# Patient Record
Sex: MALE | URBAN - METROPOLITAN AREA
[De-identification: names, ages, dates, MRNs, and addresses within clinical notes are randomized per-mention and may not be internally consistent; named-entity substitution may affect disease eponyms.]

---

## 2018-05-11 ENCOUNTER — APPOINTMENT (RX ONLY)
Dept: URBAN - METROPOLITAN AREA OTHER 12 | Facility: OTHER | Age: 68
Setting detail: DERMATOLOGY
End: 2018-05-11

## 2018-05-11 DIAGNOSIS — L82.0 INFLAMED SEBORRHEIC KERATOSIS: ICD-10-CM

## 2018-05-11 PROCEDURE — 17110 DESTRUCTION B9 LES UP TO 14: CPT

## 2018-05-11 PROCEDURE — ? LIQUID NITROGEN

## 2018-05-11 PROCEDURE — ? COUNSELING

## 2018-05-11 ASSESSMENT — LOCATION SIMPLE DESCRIPTION DERM
LOCATION SIMPLE: SCALP
LOCATION SIMPLE: RIGHT UPPER BACK
LOCATION SIMPLE: RIGHT LOWER BACK
LOCATION SIMPLE: LEFT UPPER BACK

## 2018-05-11 ASSESSMENT — LOCATION DETAILED DESCRIPTION DERM
LOCATION DETAILED: RIGHT INFERIOR UPPER BACK
LOCATION DETAILED: LEFT SUPERIOR UPPER BACK
LOCATION DETAILED: RIGHT SUPERIOR PARIETAL SCALP
LOCATION DETAILED: RIGHT MID-UPPER BACK
LOCATION DETAILED: LEFT INFERIOR UPPER BACK
LOCATION DETAILED: RIGHT SUPERIOR MEDIAL MIDBACK
LOCATION DETAILED: LEFT MID-UPPER BACK

## 2018-05-11 ASSESSMENT — LOCATION ZONE DERM
LOCATION ZONE: TRUNK
LOCATION ZONE: SCALP

## 2018-09-19 ENCOUNTER — APPOINTMENT (RX ONLY)
Dept: URBAN - METROPOLITAN AREA OTHER 12 | Facility: OTHER | Age: 68
Setting detail: DERMATOLOGY
End: 2018-09-19

## 2018-09-19 DIAGNOSIS — L82.0 INFLAMED SEBORRHEIC KERATOSIS: ICD-10-CM

## 2018-09-19 PROCEDURE — ? LIQUID NITROGEN

## 2018-09-19 PROCEDURE — 17110 DESTRUCTION B9 LES UP TO 14: CPT

## 2018-09-19 PROCEDURE — ? COUNSELING

## 2018-09-19 ASSESSMENT — LOCATION DETAILED DESCRIPTION DERM
LOCATION DETAILED: LEFT SUPERIOR MEDIAL UPPER BACK
LOCATION DETAILED: LEFT INFERIOR UPPER BACK
LOCATION DETAILED: SUPERIOR LUMBAR SPINE
LOCATION DETAILED: LEFT SUPERIOR OCCIPITAL SCALP

## 2018-09-19 ASSESSMENT — LOCATION ZONE DERM
LOCATION ZONE: TRUNK
LOCATION ZONE: SCALP

## 2018-09-19 ASSESSMENT — LOCATION SIMPLE DESCRIPTION DERM
LOCATION SIMPLE: LEFT OCCIPITAL SCALP
LOCATION SIMPLE: LEFT UPPER BACK
LOCATION SIMPLE: LOWER BACK

## 2018-09-19 NOTE — PROCEDURE: LIQUID NITROGEN
Number Of Freeze-Thaw Cycles: 2 freeze-thaw cycles
Post-Care Instructions: I reviewed with the patient in detail post-care instructions. Patient is to wear sunprotection, and avoid picking at any of the treated lesions. Pt may apply Vaseline to crusted or scabbing areas.
Consent: The patient's consent was obtained including but not limited to risks of crusting, scabbing, blistering, scarring, darker or lighter pigmentary change, recurrence, incomplete removal and infection.
Duration Of Freeze Thaw-Cycle (Seconds): 0
Detail Level: Zone
Render Post Care In The Note?: yes
Medical Necessity Clause: This procedure was medically necessary because the lesions that were treated were:
Total Number Of Lesions Treated: 4
Medical Necessity Information: It is in your best interest to select a reason for this procedure from the list below. All of these items fulfill various CMS LCD requirements except the new and changing color options.
Add 52 Modifier (Optional): no

## 2019-12-12 NOTE — PROCEDURE: LIQUID NITROGEN
Number Of Freeze-Thaw Cycles: 1 freeze-thaw cycle
Consent: The patient's consent was obtained including but not limited to risks of crusting, scabbing, blistering, scarring, darker or lighter pigmentary change, recurrence, incomplete removal and infection.
Add 52 Modifier (Optional): no
Medical Necessity Clause: This procedure was medically necessary because the lesions that were treated were:
Post-Care Instructions: I reviewed with the patient in detail post-care instructions. Patient is to wear sunprotection, and avoid picking at any of the treated lesions. Pt may apply Vaseline to crusted or scabbing areas.
Medical Necessity Information: It is in your best interest to select a reason for this procedure from the list below. All of these items fulfill various CMS LCD requirements except the new and changing color options.
Detail Level: Zone
Mauc Instructions: By selecting yes to the question below the MAUC number will be added into the note.  This will be calculated automatically based on the diagnosis chosen, the size entered, the body zone selected (H,M,L) and the specific indications you chose. You will also have the option to override the Mohs AUC if you disagree with the automatically calculated number and this option is found in the Case Summary tab.

## 2020-03-24 ENCOUNTER — OFFICE VISIT (OUTPATIENT)
Dept: URGENT CARE | Facility: CLINIC | Age: 70
End: 2020-03-24
Payer: MEDICARE

## 2020-03-24 VITALS
SYSTOLIC BLOOD PRESSURE: 122 MMHG | HEIGHT: 68 IN | BODY MASS INDEX: 30.62 KG/M2 | TEMPERATURE: 100.7 F | HEART RATE: 92 BPM | WEIGHT: 202 LBS | DIASTOLIC BLOOD PRESSURE: 64 MMHG | OXYGEN SATURATION: 100 %

## 2020-03-24 DIAGNOSIS — R50.9 FEVER, UNSPECIFIED FEVER CAUSE: ICD-10-CM

## 2020-03-24 LAB
FLUAV+FLUBV AG SPEC QL IA: NORMAL
INT CON NEG: NORMAL
INT CON POS: NORMAL

## 2020-03-24 PROCEDURE — 99204 OFFICE O/P NEW MOD 45 MIN: CPT | Performed by: PHYSICIAN ASSISTANT

## 2020-03-24 PROCEDURE — 87804 INFLUENZA ASSAY W/OPTIC: CPT | Performed by: PHYSICIAN ASSISTANT

## 2020-03-24 ASSESSMENT — ENCOUNTER SYMPTOMS
WEAKNESS: 1
COUGH: 1
CHILLS: 1
FEVER: 1
HEADACHES: 1
MYALGIAS: 1
ARTHRALGIAS: 1
BODY ACHES: 1

## 2020-03-24 NOTE — PATIENT INSTRUCTIONS
Self-Isolating at Home  If it is determined that you do not need to be hospitalized and can be isolated at home please follow the follow the prevention steps below as based on CDC guidelines.  Stay home except to get medical care  People who are mildly ill with unconfirmed COVID-19 or have any other infectious respiratory illness are able to isolate at home during their illness. You should restrict activities outside your home, except for getting medical care. Do not go to work, school, or public areas. Avoid using public transportation, ride-sharing, or taxis.  Call ahead before visiting your doctor  If you have a medical appointment, call the healthcare provider and tell them that you have or may have unconfirmed COVID-19 or another possibly contagious respiratory illness. This will help the healthcare provider’s office take steps to keep other people from getting infected or exposed.  Separate yourself from other people and animals in your home  As much as possible, you should stay in a specific room and away from other people in your home. Also, you should use a separate bathroom, if available.  You should restrict contact with pets and other animals while you are sick, just like you would around other people. When possible, have another member of your household care for your animals while you are sick. If you must care for your pet or be around animals while you are sick, wash your hands before and after you interact with pets.  Wear a facemask  You should wear a facemask when you are around other people (e.g., sharing a room or vehicle) or pets and before you enter a healthcare provider’s office. If you are not able to wear a facemask (for example, because it causes trouble breathing), then people who live with you should not stay in the same room with you, or they should wear a facemask if they enter your room.  Cover your coughs and sneezes  Cover your mouth and nose with a tissue when you cough or sneeze.  Throw used tissues in a lined trash can. Immediately wash your hands with soap and water for at least 20 seconds or, if soap and water are not available, clean your hands with an alcohol-based hand  that contains at least 60% alcohol.  Clean your hands often  Wash your hands often with soap and water for at least 20 seconds, especially after blowing your nose, coughing, or sneezing; going to the bathroom; and before eating or preparing food. If soap and water are not readily available, use an alcohol-based hand  with at least 60% alcohol, covering all surfaces of your hands and rubbing them together until they feel dry.  Soap and water are the best option if hands are visibly dirty. Avoid touching your eyes, nose, and mouth with unwashed hands.  Avoid sharing personal household items  You should not share dishes, drinking glasses, cups, eating utensils, towels, or bedding with other people or pets in your home. After using these items, they should be washed thoroughly with soap and water.  Clean all “high-touch” surfaces everyday  High touch surfaces include counters, tabletops, doorknobs, bathroom fixtures, toilets, phones, keyboards, tablets, and bedside tables. Also, clean any surfaces that may have blood, stool, or body fluids on them. Use a household cleaning spray or wipe, according to the label instructions. Labels contain instructions for safe and effective use of the cleaning product including precautions you should take when applying the product, such as wearing gloves and making sure you have good ventilation during use of the product.  Monitor your symptoms  Seek prompt medical attention if your illness is worsening (e.g., difficulty breathing). Before seeking care, call your healthcare provider and tell them that you have, or are being evaluated for, unconfirmed COVID-19 or another infectious respiratory illness. Put on a facemask before you enter the facility. These steps will help  the healthcare provider’s office to keep other people in the office or waiting room from getting infected or exposed. Ask your healthcare provider to call the local or state health department. Persons who are placed under active monitoring or facilitated self-monitoring should follow instructions provided by their local health department or occupational health professionals, as appropriate. When working with your local health department check their available hours.  If you have a medical emergency and need to call 911, notify the dispatch personnel that you have, or are being evaluated for unconfirmed COVID-19 or another infectious respiratory illness. If possible, put on a facemask before emergency medical services arrive.  Discontinuing home isolation  Patients with unconfirmed COVID-19 or other infectious respiratory illnesses should remain under home isolation precautions until the risk of secondary transmission to others is thought to be low. In general that means 72 hours after fever resolves without the use of fever reducing medications, AND symptoms have improved AND at least 7 days since symptoms first appeared. If you have questions or concerns consult your healthcare providers or your local health department.  Per CDC guidelines, you are not required to provide a healthcare provider’s note to validate your illness or to return to work, as healthcare provider offices and medical facilities may be extremely busy and not able to provide such documentation in a timely way.

## 2020-03-24 NOTE — PROGRESS NOTES
"  Subjective:   Justin Copeland is a 69 y.o. male who presents today with   Chief Complaint   Patient presents with   • Chills     last night   • Body Aches       Generalized Body Aches   This is a new problem. The current episode started yesterday. The problem occurs constantly. The problem has been unchanged. Associated symptoms include arthralgias, chills, coughing, a fever, headaches, myalgias and weakness. Pertinent negatives include no urinary symptoms. He has tried nothing for the symptoms. The treatment provided no relief.     Patient does report recent travel history stating that he returned from [place] on March 13.  He states he has had his flu and pneumonia shot recently.  PMH:  has no past medical history on file.  MEDS: No current outpatient medications on file.  ALLERGIES: Not on File  SURGHX: No past surgical history on file.  SOCHX:  reports that he has never smoked. He has never used smokeless tobacco.  FH: Reviewed with patient, not pertinent to this visit.       Review of Systems   Constitutional: Positive for chills, fever and malaise/fatigue.   Respiratory: Positive for cough.    Musculoskeletal: Positive for arthralgias and myalgias.   Neurological: Positive for weakness and headaches.   All other systems reviewed and are negative.       Objective:   /64   Pulse 92   Temp (!) 38.2 °C (100.7 °F) (Temporal)   Ht 1.727 m (5' 8\")   Wt 91.6 kg (202 lb)   SpO2 100%   BMI 30.71 kg/m²   Physical Exam  Vitals signs and nursing note reviewed.   Constitutional:       General: He is not in acute distress.     Appearance: Normal appearance. He is well-developed and normal weight. He is ill-appearing. He is not toxic-appearing or diaphoretic.   HENT:      Head: Normocephalic and atraumatic.      Right Ear: Hearing, tympanic membrane and ear canal normal.      Left Ear: Hearing, tympanic membrane and ear canal normal.   Eyes:      Pupils: Pupils are equal, round, and reactive to light. "   Cardiovascular:      Rate and Rhythm: Normal rate and regular rhythm.      Heart sounds: Normal heart sounds.   Pulmonary:      Effort: Pulmonary effort is normal. No respiratory distress.      Breath sounds: No stridor. No wheezing, rhonchi or rales.   Musculoskeletal:      Comments: Normal movement in all 4 extremities   Skin:     General: Skin is warm and dry.   Neurological:      Mental Status: He is alert.      Coordination: Coordination normal.   Psychiatric:         Mood and Affect: Mood normal.       FLU NEG    Assessment/Plan:   Assessment    1. Fever, unspecified fever cause  - POCT Influenza A/B  Given strong history of recent travel full PPE was worn throughout visit and patient was immediately roomed.  Discussed with patient given unexplained fever and recent travel he should follow CDC guidelines and self quarantine at this time.  Patient was given self-isolation instructions today.  Patient encouraged to get plenty of rest, use OTC tylenol for pain/fever, and drink plenty of fluids.    Differential diagnosis, natural history, supportive care, and indications for immediate follow-up discussed.   Patient given instructions and understanding of medications and treatment.    If not improving in 3-5 days, F/U with PCP or return to  if symptoms worsen.    Patient agreeable to plan.    Please note that this dictation was created using voice recognition software. I have made every reasonable attempt to correct obvious errors, but I expect that there are errors of grammar and possibly content that I did not discover before finalizing the note.    Israel Bush PA-C

## 2020-03-25 ENCOUNTER — HOSPITAL ENCOUNTER (INPATIENT)
Facility: MEDICAL CENTER | Age: 70
LOS: 4 days | DRG: 871 | End: 2020-03-29
Attending: EMERGENCY MEDICINE | Admitting: HOSPITALIST
Payer: MEDICARE

## 2020-03-25 ENCOUNTER — APPOINTMENT (OUTPATIENT)
Dept: RADIOLOGY | Facility: MEDICAL CENTER | Age: 70
DRG: 871 | End: 2020-03-25
Attending: EMERGENCY MEDICINE
Payer: MEDICARE

## 2020-03-25 DIAGNOSIS — A41.50 SEPSIS DUE TO GRAM-NEGATIVE ORGANISM WITH SEPTIC SHOCK (HCC): ICD-10-CM

## 2020-03-25 DIAGNOSIS — R06.03 ACUTE RESPIRATORY DISTRESS: ICD-10-CM

## 2020-03-25 DIAGNOSIS — R65.21 SEPSIS DUE TO GRAM-NEGATIVE ORGANISM WITH SEPTIC SHOCK (HCC): ICD-10-CM

## 2020-03-25 PROBLEM — N17.9 ACUTE RENAL FAILURE (ARF) (HCC): Status: ACTIVE | Noted: 2020-03-25

## 2020-03-25 PROBLEM — I10 HYPERTENSION: Status: ACTIVE | Noted: 2020-03-25

## 2020-03-25 PROBLEM — E87.20 LACTIC ACIDOSIS: Status: ACTIVE | Noted: 2020-03-25

## 2020-03-25 PROBLEM — R65.20 SEVERE SEPSIS (HCC): Status: ACTIVE | Noted: 2020-03-25

## 2020-03-25 PROBLEM — J96.01 ACUTE RESPIRATORY FAILURE WITH HYPOXIA (HCC): Status: ACTIVE | Noted: 2020-03-25

## 2020-03-25 PROBLEM — R57.9 SHOCK (HCC): Status: ACTIVE | Noted: 2020-03-25

## 2020-03-25 PROBLEM — A41.9 SEPSIS (HCC): Status: ACTIVE | Noted: 2020-03-25

## 2020-03-25 LAB
ALBUMIN SERPL BCP-MCNC: 3.9 G/DL (ref 3.2–4.9)
ALBUMIN/GLOB SERPL: 1.3 G/DL
ALP SERPL-CCNC: 67 U/L (ref 30–99)
ALT SERPL-CCNC: 32 U/L (ref 2–50)
ANION GAP SERPL CALC-SCNC: 23 MMOL/L (ref 7–16)
APPEARANCE UR: CLEAR
AST SERPL-CCNC: 43 U/L (ref 12–45)
BACTERIA #/AREA URNS HPF: ABNORMAL /HPF
BASOPHILS # BLD AUTO: 0.4 % (ref 0–1.8)
BASOPHILS # BLD: 0.07 K/UL (ref 0–0.12)
BILIRUB SERPL-MCNC: 1.7 MG/DL (ref 0.1–1.5)
BILIRUB UR QL STRIP.AUTO: ABNORMAL
BUN SERPL-MCNC: 20 MG/DL (ref 8–22)
CALCIUM SERPL-MCNC: 8.8 MG/DL (ref 8.4–10.2)
CASTS URNS QL MICRO: ABNORMAL /LPF
CHLORIDE SERPL-SCNC: 92 MMOL/L (ref 96–112)
CO2 SERPL-SCNC: 18 MMOL/L (ref 20–33)
COLOR UR: YELLOW
CREAT SERPL-MCNC: 1.95 MG/DL (ref 0.5–1.4)
D DIMER PPP IA.FEU-MCNC: 6.37 UG/ML (FEU) (ref 0–0.5)
EKG IMPRESSION: NORMAL
EOSINOPHIL # BLD AUTO: 0.04 K/UL (ref 0–0.51)
EOSINOPHIL NFR BLD: 0.3 % (ref 0–6.9)
EPI CELLS #/AREA URNS HPF: ABNORMAL /HPF
ERYTHROCYTE [DISTWIDTH] IN BLOOD BY AUTOMATED COUNT: 45.6 FL (ref 35.9–50)
FLUAV RNA SPEC QL NAA+PROBE: NEGATIVE
FLUBV RNA SPEC QL NAA+PROBE: NEGATIVE
GLOBULIN SER CALC-MCNC: 3 G/DL (ref 1.9–3.5)
GLUCOSE SERPL-MCNC: 153 MG/DL (ref 65–99)
GLUCOSE UR STRIP.AUTO-MCNC: NEGATIVE MG/DL
HCT VFR BLD AUTO: 47.6 % (ref 42–52)
HGB BLD-MCNC: 15.9 G/DL (ref 14–18)
IMM GRANULOCYTES # BLD AUTO: 0.13 K/UL (ref 0–0.11)
IMM GRANULOCYTES NFR BLD AUTO: 0.8 % (ref 0–0.9)
INR PPP: 1.04 (ref 0.87–1.13)
KETONES UR STRIP.AUTO-MCNC: 15 MG/DL
LACTATE BLD-SCNC: 2.7 MMOL/L (ref 0.5–2)
LACTATE BLD-SCNC: 2.9 MMOL/L (ref 0.5–2)
LACTATE BLD-SCNC: 3.1 MMOL/L (ref 0.5–2)
LACTATE BLD-SCNC: 5.2 MMOL/L (ref 0.5–2)
LEUKOCYTE ESTERASE UR QL STRIP.AUTO: NEGATIVE
LYMPHOCYTES # BLD AUTO: 0.6 K/UL (ref 1–4.8)
LYMPHOCYTES NFR BLD: 3.8 % (ref 22–41)
MCH RBC QN AUTO: 30.6 PG (ref 27–33)
MCHC RBC AUTO-ENTMCNC: 33.4 G/DL (ref 33.7–35.3)
MCV RBC AUTO: 91.7 FL (ref 81.4–97.8)
MICRO URNS: ABNORMAL
MONOCYTES # BLD AUTO: 0.3 K/UL (ref 0–0.85)
MONOCYTES NFR BLD AUTO: 1.9 % (ref 0–13.4)
MUCOUS THREADS #/AREA URNS HPF: ABNORMAL /HPF
NEUTROPHILS # BLD AUTO: 14.54 K/UL (ref 1.82–7.42)
NEUTROPHILS NFR BLD: 92.8 % (ref 44–72)
NITRITE UR QL STRIP.AUTO: NEGATIVE
NRBC # BLD AUTO: 0 K/UL
NRBC BLD-RTO: 0 /100 WBC
NT-PROBNP SERPL IA-MCNC: 620 PG/ML (ref 0–125)
PH UR STRIP.AUTO: 5.5 [PH] (ref 5–8)
PLATELET # BLD AUTO: 225 K/UL (ref 164–446)
PMV BLD AUTO: 11.1 FL (ref 9–12.9)
POTASSIUM SERPL-SCNC: 4.3 MMOL/L (ref 3.6–5.5)
PROCALCITONIN SERPL-MCNC: 6.94 NG/ML
PROT SERPL-MCNC: 6.9 G/DL (ref 6–8.2)
PROT UR QL STRIP: 100 MG/DL
PROTHROMBIN TIME: 13.7 SEC (ref 12–14.6)
RBC # BLD AUTO: 5.19 M/UL (ref 4.7–6.1)
RBC # URNS HPF: ABNORMAL /HPF
RBC UR QL AUTO: NEGATIVE
SODIUM SERPL-SCNC: 133 MMOL/L (ref 135–145)
SP GR UR REFRACTOMETRY: 1.02
TROPONIN T SERPL-MCNC: 33 NG/L (ref 6–19)
UNIDENT CRYS URNS QL MICRO: ABNORMAL /HPF
WBC # BLD AUTO: 15.7 K/UL (ref 4.8–10.8)
WBC #/AREA URNS HPF: ABNORMAL /HPF

## 2020-03-25 PROCEDURE — 83880 ASSAY OF NATRIURETIC PEPTIDE: CPT

## 2020-03-25 PROCEDURE — 87086 URINE CULTURE/COLONY COUNT: CPT

## 2020-03-25 PROCEDURE — 700111 HCHG RX REV CODE 636 W/ 250 OVERRIDE (IP): Performed by: HOSPITALIST

## 2020-03-25 PROCEDURE — 770022 HCHG ROOM/CARE - ICU (200)

## 2020-03-25 PROCEDURE — 85379 FIBRIN DEGRADATION QUANT: CPT

## 2020-03-25 PROCEDURE — 83605 ASSAY OF LACTIC ACID: CPT | Mod: 91

## 2020-03-25 PROCEDURE — A9270 NON-COVERED ITEM OR SERVICE: HCPCS | Performed by: HOSPITALIST

## 2020-03-25 PROCEDURE — 87040 BLOOD CULTURE FOR BACTERIA: CPT | Mod: 91

## 2020-03-25 PROCEDURE — 84484 ASSAY OF TROPONIN QUANT: CPT

## 2020-03-25 PROCEDURE — 700117 HCHG RX CONTRAST REV CODE 255: Performed by: EMERGENCY MEDICINE

## 2020-03-25 PROCEDURE — 85610 PROTHROMBIN TIME: CPT

## 2020-03-25 PROCEDURE — 99291 CRITICAL CARE FIRST HOUR: CPT | Performed by: INTERNAL MEDICINE

## 2020-03-25 PROCEDURE — 80053 COMPREHEN METABOLIC PANEL: CPT

## 2020-03-25 PROCEDURE — 700111 HCHG RX REV CODE 636 W/ 250 OVERRIDE (IP): Performed by: EMERGENCY MEDICINE

## 2020-03-25 PROCEDURE — 700105 HCHG RX REV CODE 258: Performed by: EMERGENCY MEDICINE

## 2020-03-25 PROCEDURE — 700105 HCHG RX REV CODE 258: Performed by: HOSPITALIST

## 2020-03-25 PROCEDURE — 700102 HCHG RX REV CODE 250 W/ 637 OVERRIDE(OP): Performed by: HOSPITALIST

## 2020-03-25 PROCEDURE — 87502 INFLUENZA DNA AMP PROBE: CPT

## 2020-03-25 PROCEDURE — 96367 TX/PROPH/DG ADDL SEQ IV INF: CPT

## 2020-03-25 PROCEDURE — 93005 ELECTROCARDIOGRAM TRACING: CPT

## 2020-03-25 PROCEDURE — 81001 URINALYSIS AUTO W/SCOPE: CPT

## 2020-03-25 PROCEDURE — 84145 PROCALCITONIN (PCT): CPT

## 2020-03-25 PROCEDURE — 87077 CULTURE AEROBIC IDENTIFY: CPT

## 2020-03-25 PROCEDURE — 99291 CRITICAL CARE FIRST HOUR: CPT | Performed by: HOSPITALIST

## 2020-03-25 PROCEDURE — 87186 SC STD MICRODIL/AGAR DIL: CPT

## 2020-03-25 PROCEDURE — 99291 CRITICAL CARE FIRST HOUR: CPT

## 2020-03-25 PROCEDURE — 85025 COMPLETE CBC W/AUTO DIFF WBC: CPT

## 2020-03-25 PROCEDURE — 96365 THER/PROPH/DIAG IV INF INIT: CPT

## 2020-03-25 PROCEDURE — 71045 X-RAY EXAM CHEST 1 VIEW: CPT

## 2020-03-25 PROCEDURE — 71275 CT ANGIOGRAPHY CHEST: CPT

## 2020-03-25 PROCEDURE — 700101 HCHG RX REV CODE 250: Performed by: HOSPITALIST

## 2020-03-25 RX ORDER — LABETALOL HYDROCHLORIDE 5 MG/ML
10 INJECTION, SOLUTION INTRAVENOUS EVERY 4 HOURS PRN
Status: DISCONTINUED | OUTPATIENT
Start: 2020-03-25 | End: 2020-03-26

## 2020-03-25 RX ORDER — BISACODYL 10 MG
10 SUPPOSITORY, RECTAL RECTAL
Status: DISCONTINUED | OUTPATIENT
Start: 2020-03-25 | End: 2020-03-29 | Stop reason: HOSPADM

## 2020-03-25 RX ORDER — AMPICILLIN AND SULBACTAM 2; 1 G/1; G/1
INJECTION, POWDER, FOR SOLUTION INTRAMUSCULAR; INTRAVENOUS
Status: COMPLETED
Start: 2020-03-25 | End: 2020-03-25

## 2020-03-25 RX ORDER — ONDANSETRON 4 MG/1
4 TABLET, ORALLY DISINTEGRATING ORAL EVERY 4 HOURS PRN
Status: DISCONTINUED | OUTPATIENT
Start: 2020-03-25 | End: 2020-03-29 | Stop reason: HOSPADM

## 2020-03-25 RX ORDER — ALBUTEROL SULFATE 90 UG/1
2 AEROSOL, METERED RESPIRATORY (INHALATION) EVERY 4 HOURS PRN
Status: DISCONTINUED | OUTPATIENT
Start: 2020-03-25 | End: 2020-03-25

## 2020-03-25 RX ORDER — LISINOPRIL 10 MG/1
10 TABLET ORAL DAILY
COMMUNITY
Start: 2020-03-04

## 2020-03-25 RX ORDER — AZITHROMYCIN 250 MG/1
500 TABLET, FILM COATED ORAL DAILY
Status: DISCONTINUED | OUTPATIENT
Start: 2020-03-25 | End: 2020-03-26

## 2020-03-25 RX ORDER — ACETAMINOPHEN 325 MG/1
650 TABLET ORAL EVERY 6 HOURS PRN
Status: DISCONTINUED | OUTPATIENT
Start: 2020-03-25 | End: 2020-03-29 | Stop reason: HOSPADM

## 2020-03-25 RX ORDER — SODIUM CHLORIDE, SODIUM LACTATE, POTASSIUM CHLORIDE, AND CALCIUM CHLORIDE .6; .31; .03; .02 G/100ML; G/100ML; G/100ML; G/100ML
30 INJECTION, SOLUTION INTRAVENOUS
Status: COMPLETED | OUTPATIENT
Start: 2020-03-25 | End: 2020-03-25

## 2020-03-25 RX ORDER — SODIUM CHLORIDE, SODIUM LACTATE, POTASSIUM CHLORIDE, CALCIUM CHLORIDE 600; 310; 30; 20 MG/100ML; MG/100ML; MG/100ML; MG/100ML
INJECTION, SOLUTION INTRAVENOUS CONTINUOUS
Status: DISCONTINUED | OUTPATIENT
Start: 2020-03-25 | End: 2020-03-27

## 2020-03-25 RX ORDER — ONDANSETRON 2 MG/ML
4 INJECTION INTRAMUSCULAR; INTRAVENOUS EVERY 4 HOURS PRN
Status: DISCONTINUED | OUTPATIENT
Start: 2020-03-25 | End: 2020-03-29 | Stop reason: HOSPADM

## 2020-03-25 RX ORDER — FUROSEMIDE 20 MG/1
20 TABLET ORAL DAILY
COMMUNITY
Start: 2020-03-04

## 2020-03-25 RX ORDER — SODIUM CHLORIDE, SODIUM LACTATE, POTASSIUM CHLORIDE, AND CALCIUM CHLORIDE .6; .31; .03; .02 G/100ML; G/100ML; G/100ML; G/100ML
1000 INJECTION, SOLUTION INTRAVENOUS
Status: COMPLETED | OUTPATIENT
Start: 2020-03-25 | End: 2020-03-26

## 2020-03-25 RX ORDER — SODIUM CHLORIDE, SODIUM LACTATE, POTASSIUM CHLORIDE, AND CALCIUM CHLORIDE .6; .31; .03; .02 G/100ML; G/100ML; G/100ML; G/100ML
30 INJECTION, SOLUTION INTRAVENOUS
Status: DISCONTINUED | OUTPATIENT
Start: 2020-03-25 | End: 2020-03-29 | Stop reason: HOSPADM

## 2020-03-25 RX ORDER — AMOXICILLIN 250 MG
2 CAPSULE ORAL 2 TIMES DAILY
Status: DISCONTINUED | OUTPATIENT
Start: 2020-03-25 | End: 2020-03-29 | Stop reason: HOSPADM

## 2020-03-25 RX ORDER — POLYETHYLENE GLYCOL 3350 17 G/17G
1 POWDER, FOR SOLUTION ORAL
Status: DISCONTINUED | OUTPATIENT
Start: 2020-03-25 | End: 2020-03-29 | Stop reason: HOSPADM

## 2020-03-25 RX ADMIN — SODIUM CHLORIDE, POTASSIUM CHLORIDE, SODIUM LACTATE AND CALCIUM CHLORIDE 2733 ML: 600; 310; 30; 20 INJECTION, SOLUTION INTRAVENOUS at 14:05

## 2020-03-25 RX ADMIN — NOREPINEPHRINE BITARTRATE 2 MCG/MIN: 1 INJECTION, SOLUTION, CONCENTRATE INTRAVENOUS at 22:47

## 2020-03-25 RX ADMIN — AMPICILLIN SODIUM AND SULBACTAM SODIUM 3 G: 2; 1 INJECTION, POWDER, FOR SOLUTION INTRAMUSCULAR; INTRAVENOUS at 23:52

## 2020-03-25 RX ADMIN — CEFTRIAXONE SODIUM 1 G: 1 INJECTION, POWDER, FOR SOLUTION INTRAMUSCULAR; INTRAVENOUS at 14:05

## 2020-03-25 RX ADMIN — IOHEXOL 75 ML: 350 INJECTION, SOLUTION INTRAVENOUS at 14:48

## 2020-03-25 RX ADMIN — AMPICILLIN SODIUM AND SULBACTAM SODIUM 3 G: 2; 1 INJECTION, POWDER, FOR SOLUTION INTRAMUSCULAR; INTRAVENOUS at 16:15

## 2020-03-25 RX ADMIN — AZITHROMYCIN MONOHYDRATE 500 MG: 250 TABLET ORAL at 18:46

## 2020-03-25 RX ADMIN — SODIUM CHLORIDE, POTASSIUM CHLORIDE, SODIUM LACTATE AND CALCIUM CHLORIDE: 600; 310; 30; 20 INJECTION, SOLUTION INTRAVENOUS at 18:40

## 2020-03-25 ASSESSMENT — COGNITIVE AND FUNCTIONAL STATUS - GENERAL
TOILETING: A LITTLE
WALKING IN HOSPITAL ROOM: A LITTLE
MOBILITY SCORE: 23
SUGGESTED CMS G CODE MODIFIER MOBILITY: CI
DAILY ACTIVITIY SCORE: 23
SUGGESTED CMS G CODE MODIFIER DAILY ACTIVITY: CI

## 2020-03-25 ASSESSMENT — ENCOUNTER SYMPTOMS
NERVOUS/ANXIOUS: 0
PALPITATIONS: 0
STRIDOR: 0
NECK PAIN: 0
EYES NEGATIVE: 1
COUGH: 0
DEPRESSION: 0
SPEECH CHANGE: 0
BLURRED VISION: 0
EYE PAIN: 0
ORTHOPNEA: 0
CLAUDICATION: 0
CONSTIPATION: 0
PND: 0
SHORTNESS OF BREATH: 0
MEMORY LOSS: 0
CARDIOVASCULAR NEGATIVE: 1
MYALGIAS: 0
MUSCULOSKELETAL NEGATIVE: 1
BLOOD IN STOOL: 0
HEARTBURN: 0
HEMOPTYSIS: 0
CHILLS: 1
DIZZINESS: 0
NEUROLOGICAL NEGATIVE: 1
PHOTOPHOBIA: 0
SORE THROAT: 0
SENSORY CHANGE: 0
BACK PAIN: 0
WEIGHT LOSS: 1
SPUTUM PRODUCTION: 0
DOUBLE VISION: 0
TREMORS: 0
WEAKNESS: 0
FEVER: 1
TINGLING: 0
PSYCHIATRIC NEGATIVE: 1
GASTROINTESTINAL NEGATIVE: 1
HEADACHES: 0
NAUSEA: 0
VOMITING: 0

## 2020-03-25 ASSESSMENT — LIFESTYLE VARIABLES
ON A TYPICAL DAY WHEN YOU DRINK ALCOHOL HOW MANY DRINKS DO YOU HAVE: 0
TOTAL SCORE: 0
HOW MANY TIMES IN THE PAST YEAR HAVE YOU HAD 5 OR MORE DRINKS IN A DAY: 0
EVER FELT BAD OR GUILTY ABOUT YOUR DRINKING: NO
EVER_SMOKED: YES
TOTAL SCORE: 0
CONSUMPTION TOTAL: INCOMPLETE
AVERAGE NUMBER OF DAYS PER WEEK YOU HAVE A DRINK CONTAINING ALCOHOL: 0
ALCOHOL_USE: NO
TOTAL SCORE: 0
EVER HAD A DRINK FIRST THING IN THE MORNING TO STEADY YOUR NERVES TO GET RID OF A HANGOVER: NO
HAVE PEOPLE ANNOYED YOU BY CRITICIZING YOUR DRINKING: NO
EVER_SMOKED: UNABLE TO EVALUATE AT THIS TIME - NEEDS ASSESSMENT PRIOR TO DISCHARGE
HAVE PEOPLE ANNOYED YOU BY CRITICIZING YOUR DRINKING: NO
ALCOHOL_USE: NO
CONSUMPTION TOTAL: NEGATIVE
HAVE YOU EVER FELT YOU SHOULD CUT DOWN ON YOUR DRINKING: NO
EVER HAD A DRINK FIRST THING IN THE MORNING TO STEADY YOUR NERVES TO GET RID OF A HANGOVER: NO
EVER FELT BAD OR GUILTY ABOUT YOUR DRINKING: NO
HAVE YOU EVER FELT YOU SHOULD CUT DOWN ON YOUR DRINKING: NO
TOTAL SCORE: 0

## 2020-03-25 ASSESSMENT — COPD QUESTIONNAIRES
DO YOU EVER COUGH UP ANY MUCUS OR PHLEGM?: NO/ONLY WITH OCCASIONAL COLDS OR INFECTIONS
HAVE YOU SMOKED AT LEAST 100 CIGARETTES IN YOUR ENTIRE LIFE: YES
COPD SCREENING SCORE: 4
DURING THE PAST 4 WEEKS HOW MUCH DID YOU FEEL SHORT OF BREATH: NONE/LITTLE OF THE TIME

## 2020-03-25 ASSESSMENT — FIBROSIS 4 INDEX
FIB4 SCORE: 2.4
FIB4 SCORE: 2.4

## 2020-03-25 ASSESSMENT — PATIENT HEALTH QUESTIONNAIRE - PHQ9
2. FEELING DOWN, DEPRESSED, IRRITABLE, OR HOPELESS: NOT AT ALL
SUM OF ALL RESPONSES TO PHQ9 QUESTIONS 1 AND 2: 0
1. LITTLE INTEREST OR PLEASURE IN DOING THINGS: NOT AT ALL

## 2020-03-25 NOTE — ED TRIAGE NOTES
Pt amb to triage c/o SOB, fatigue and muscle aches <1wk, progressively worsening. Pt seen at urgent care yesterday and instr to go to er if s/s worsened. Pt has hx recent international travel, pt returned from  [place] 3-13-20. Charge RN notified; Pt brought directly back to rm#5

## 2020-03-25 NOTE — ASSESSMENT & PLAN NOTE
Sepsis criteria resolved  Gram negative bacteremia - sensitivities resulted-  Klebsiella, quinolone sensitive  UA negative for UTI  RUQ us - fatty liver changes, no biliary dilation  Repeat Blood cultures 3/27 - no growth

## 2020-03-25 NOTE — ED PROVIDER NOTES
ED Provider Note    CHIEF COMPLAINT  Chief Complaint   Patient presents with   • Shortness of Breath   Respiratory distress    HPI  Justin Copeland is a 69 y.o. male who presents with shortness of breath and respiratory distress.  Patient states he just returned from a trip to [place].  He has had progressive respiratory distress.  He went to an urgent care yesterday and was tested for influenza which was negative.  Patient states he is gotten progressively worse today.  Patient states he has had a nonproductive cough.  He denies chest pain.  He denies vomiting or diarrhea.    No further detail the history of present illness could be obtained within the constraints of the urgency of the patient's clinical condition    REVIEW OF SYSTEMS  See HPI for further details.  Positive chills.  No fever.  Positive cough.  Positive respiratory distress.  No chest pain.  All other systems are negative.    PAST MEDICAL HISTORY  No past medical history on file.    FAMILY HISTORY  No family history on file.    SOCIAL HISTORY  Social History     Socioeconomic History   • Marital status:      Spouse name: Not on file   • Number of children: Not on file   • Years of education: Not on file   • Highest education level: Not on file   Occupational History   • Not on file   Social Needs   • Financial resource strain: Not on file   • Food insecurity     Worry: Not on file     Inability: Not on file   • Transportation needs     Medical: Not on file     Non-medical: Not on file   Tobacco Use   • Smoking status: Never Smoker   • Smokeless tobacco: Never Used   Substance and Sexual Activity   • Alcohol use: Not on file   • Drug use: Not on file   • Sexual activity: Not on file   Lifestyle   • Physical activity     Days per week: Not on file     Minutes per session: Not on file   • Stress: Not on file   Relationships   • Social connections     Talks on phone: Not on file     Gets together: Not on file     Attends L[employer] service: Not on  "file     Active member of club or organization: Not on file     Attends meetings of clubs or organizations: Not on file     Relationship status: Not on file   • Intimate partner violence     Fear of current or ex partner: Not on file     Emotionally abused: Not on file     Physically abused: Not on file     Forced sexual activity: Not on file   Other Topics Concern   • Not on file   Social History Narrative   • Not on file       SURGICAL HISTORY  No past surgical history on file.    CURRENT MEDICATIONS  Home Medications    **Home medications have not yet been reviewed for this encounter**         ALLERGIES  No Known Allergies    PHYSICAL EXAM  VITAL SIGNS: /62   Pulse (!) 114   Temp 37.3 °C (99.1 °F) (Temporal)   Resp (!) 28   Ht 1.753 m (5' 9\")   Wt 91.1 kg (200 lb 13.4 oz)   SpO2 94%   BMI 29.66 kg/m²   Constitutional: Elderly male.  Ill-appearing  HENT: Normocephalic, Atraumatic, Bilateral external ears normal, Oropharynx dry  Eyes: KIRILL, EOMI, Conjunctiva normal,   Neck: Normal range of motion, No tenderness, Supple,   Lymphatic: No lymphadenopathy noted.   Cardiovascular: Regular rate and rhythm  Thorax & Lungs: Clear without wheezing  Abdomen: Bowel sounds normal, Soft, No tenderness, No masses,   Skin: Warm, Dry, No erythema, No rash.   Back: No tenderness, No CVA tenderness.   Extremities: No edema, No tenderness, No cyanosis, No clubbing. Dorsalis pedis pulses 2+ equal bilaterally. Radial pulses 2+ equal bilaterally.  Neurologic: Alert & oriented x 3, Normal motor function, Normal sensory function, No focal deficits noted.     EKG  Sinus tachycardia rate 120.  Normal P waves.  Abnormal QRS with left anterior fascicular block.  There is a incomplete right bundle branch block.  Abnormal EKG showing a bifascicular block.    RADIOLOGY/PROCEDURES  CT-CTA CHEST PULMONARY ARTERY W/ RECONS   Final Result      Severely degraded by breathing motion artifact      No pulmonary embolism detected to the " lobar levels. More peripheral characterization is not possible due to the artifact      Coronary artery disease and mild cardiac enlargement      Hepatic steatosis      DX-CHEST-PORTABLE (1 VIEW)   Final Result      No evidence of acute cardiopulmonary process.        Results for orders placed or performed during the hospital encounter of 03/25/20   LACTIC ACID   Result Value Ref Range    Lactic Acid 2.9 (H) 0.5 - 2.0 mmol/L   CBC WITH DIFFERENTIAL   Result Value Ref Range    WBC 15.7 (H) 4.8 - 10.8 K/uL    RBC 5.19 4.70 - 6.10 M/uL    Hemoglobin 15.9 14.0 - 18.0 g/dL    Hematocrit 47.6 42.0 - 52.0 %    MCV 91.7 81.4 - 97.8 fL    MCH 30.6 27.0 - 33.0 pg    MCHC 33.4 (L) 33.7 - 35.3 g/dL    RDW 45.6 35.9 - 50.0 fL    Platelet Count 225 164 - 446 K/uL    MPV 11.1 9.0 - 12.9 fL    Neutrophils-Polys 92.80 (H) 44.00 - 72.00 %    Lymphocytes 3.80 (L) 22.00 - 41.00 %    Monocytes 1.90 0.00 - 13.40 %    Eosinophils 0.30 0.00 - 6.90 %    Basophils 0.40 0.00 - 1.80 %    Immature Granulocytes 0.80 0.00 - 0.90 %    Nucleated RBC 0.00 /100 WBC    Neutrophils (Absolute) 14.54 (H) 1.82 - 7.42 K/uL    Lymphs (Absolute) 0.60 (L) 1.00 - 4.80 K/uL    Monos (Absolute) 0.30 0.00 - 0.85 K/uL    Eos (Absolute) 0.04 0.00 - 0.51 K/uL    Baso (Absolute) 0.07 0.00 - 0.12 K/uL    Immature Granulocytes (abs) 0.13 (H) 0.00 - 0.11 K/uL    NRBC (Absolute) 0.00 K/uL   COMP METABOLIC PANEL   Result Value Ref Range    Sodium 133 (L) 135 - 145 mmol/L    Potassium 4.3 3.6 - 5.5 mmol/L    Chloride 92 (L) 96 - 112 mmol/L    Co2 18 (L) 20 - 33 mmol/L    Anion Gap 23.0 (H) 7.0 - 16.0    Glucose 153 (H) 65 - 99 mg/dL    Bun 20 8 - 22 mg/dL    Creatinine 1.95 (H) 0.50 - 1.40 mg/dL    Calcium 8.8 8.4 - 10.2 mg/dL    AST(SGOT) 43 12 - 45 U/L    ALT(SGPT) 32 2 - 50 U/L    Alkaline Phosphatase 67 30 - 99 U/L    Total Bilirubin 1.7 (H) 0.1 - 1.5 mg/dL    Albumin 3.9 3.2 - 4.9 g/dL    Total Protein 6.9 6.0 - 8.2 g/dL    Globulin 3.0 1.9 - 3.5 g/dL    A-G Ratio 1.3  g/dL   URINALYSIS   Result Value Ref Range    Color Yellow     Character Clear     Ph 5.5 5.0 - 8.0    Glucose Negative Negative mg/dL    Ketones 15 (A) Negative mg/dL    Protein 100 (A) Negative mg/dL    Bilirubin Moderate (A) Negative    Nitrite Negative Negative    Leukocyte Esterase Negative Negative    Occult Blood Negative Negative    Micro Urine Req Microscopic    TROPONIN   Result Value Ref Range    Troponin T 33 (H) 6 - 19 ng/L   D-DIMER   Result Value Ref Range    D-Dimer Screen 6.37 (H) 0.00 - 0.50 ug/mL (FEU)   proBrain Natriuretic Peptide, NT   Result Value Ref Range    NT-proBNP 620 (H) 0 - 125 pg/mL   LACTIC ACID   Result Value Ref Range    Lactic Acid 5.2 (HH) 0.5 - 2.0 mmol/L   Influenza A/B By PCR (Adult - Flu Only)   Result Value Ref Range    Influenza virus A RNA Negative Negative    Influenza virus B, PCR Negative Negative   ESTIMATED GFR   Result Value Ref Range    GFR If  41 (A) >60 mL/min/1.73 m 2    GFR If Non  34 (A) >60 mL/min/1.73 m 2   REFRACTOMETER SG   Result Value Ref Range    Specific Gravity 1.025    URINE MICROSCOPIC (W/UA)   Result Value Ref Range    WBC 2-5 (A) /hpf    RBC Rare /hpf    Bacteria Rare (A) None /hpf    Epithelial Cells Few Few /hpf    Mucous Threads Rare /hpf    Urine Crystals Few Amorphous /hpf    Urine Casts 0-2 Granular /lpf   PROCALCITONIN   Result Value Ref Range    Procalcitonin 6.94 (H) <0.25 ng/mL   Lactic Acid -STAT Once   Result Value Ref Range    Lactic Acid 3.1 (H) 0.5 - 2.0 mmol/L   Prothrombin time (INR)   Result Value Ref Range    PT 13.7 12.0 - 14.6 sec    INR 1.04 0.87 - 1.13   EKG   Result Value Ref Range    Report       [facility]    Test Date:  2020  Pt Name:    FELI HOLLOWAY               Department: [facility]  MRN:        [id number]                      [facility]  Gender:     Male                         Technician: [id number]  :        [id number]                    Requested By:ER TRIAGE PROTOCOL  Order #:    [id number]                    Reading MD:    Measurements  Intervals                                Axis  Rate:       116                          P:          75  HI:         164                          QRS:        -91  QRSD:       114                          T:          63  QT:         340  QTc:        473    Interpretive Statements  SINUS TACHYCARDIA  INCOMPLETE RBBB AND LAFB  No previous ECG available for comparison           COURSE & MEDICAL DECISION MAKING  Pertinent Labs & Imaging studies reviewed. (See chart for details)  Patient is quite ill-appearing.  Differential diagnosis includes COVID 19, pneumonia, pulmonary embolus.  Unfortunately the CT scan was unremarkable for signs of pulmonary embolus.  There was also no signs of pneumonia however it was limited study.  Blood work is concerning for high lactic acid.  He also has a high BNP.  Patient was given IV fluids and antibiotics.  Critical care consult was obtained.  Initially the intensivist wanted the patient admitted to the floor and the hospitalist.    Patient is critically ill.   The patient continues to have: Respiratory distress and hypotension  The vital organ system that is affected is the: Lungs and cardiac  If untreated there is a high chance of deterioration into: Respiratory failure  And eventually death.   The critical care that I am providing today is: Managing blood pressure, antibiotics and respiratory function  The critical that has been undertaken is medically complex.   There has been no overlap in critical care time.   Critical Care Time not including procedures: 35 minutes        FINAL IMPRESSION    1. Acute respiratory distress     2.  Rule out COVID 19        Please note that this dictation was created using voice recognition software. I have worked with consultants from the vendor as well as technical experts from [employer] to optimize the interface. I have made every reasonable  attempt to correct obvious errors, but I expect that there are errors of grammar and possibly content that I did not discover before finalizing the note.      Electronically signed by: Ori Trevizo M.D., 3/25/2020 3:41 PM

## 2020-03-25 NOTE — ASSESSMENT & PLAN NOTE
He is on lisinopril 10mg at home, will hold off in light of renal failure.  We will utilize IV labetalol.

## 2020-03-25 NOTE — H&P
Hospital Medicine History & Physical Note    Date of Service  3/25/2020    Primary Care Physician  Pcp Pt States None    Consultants  Pulm/CC     Code Status  Full Code    Chief Complaint  Chief Complaint   Patient presents with   • Shortness of Breath       History of Presenting Illness   is a very pleasant 69 y.o. male with a past medical history of HTN,  presented to the emergency room on 3/25/2020 for evaluation of fevers/chills/body aches that started yesterday. Patient said last week he came back from vacation from [place]. He also stayed [place] prior to coming back to [place]. Patient reported lack of appetite and PO intake over the past week. He reports yesterday he developed fevers/chills. But patient denies cough, dysuria, denies diarrhea.     I discussed the presenting symptoms, physical examination, lab and radiological study results with the emergency department physician.      Review of Systems  Review of Systems   Constitutional: Positive for chills, fever and malaise/fatigue.   HENT: Negative for congestion, hearing loss, sore throat and tinnitus.    Eyes: Negative for blurred vision, double vision, photophobia and pain.   Respiratory: Negative for cough, hemoptysis, sputum production, shortness of breath and stridor.    Cardiovascular: Negative for chest pain, palpitations, orthopnea, claudication and PND.   Gastrointestinal: Negative for blood in stool, constipation, heartburn, melena, nausea and vomiting.   Genitourinary: Negative for dysuria, frequency and urgency.   Musculoskeletal: Negative for back pain, myalgias and neck pain.   Neurological: Negative for dizziness, tingling, tremors, sensory change, speech change, weakness and headaches.   Psychiatric/Behavioral: Negative for depression, memory loss and suicidal ideas. The patient is not nervous/anxious.    All other systems reviewed and are negative.      Past Medical History  Hypertension    Surgical History  Denies  significant past surgeries.    Family History  Denies significant past family history of diabetes.    Social History   reports that he has never smoked. He has never used smokeless tobacco.    Allergies  No Known Allergies    Medications  Prior to Admission medications    Not on File       Physical Exam  Temp:  [37.3 °C (99.1 °F)] 37.3 °C (99.1 °F)  Pulse:  [114-131] 114  Resp:  [26-28] 28  BP: (102-121)/(57-68) 102/62  SpO2:  [89 %-95 %] 94 %  Physical Exam   Constitutional: He is oriented to person, place, and time. He appears well-developed and well-nourished. No distress.   HENT:   Head: Normocephalic and atraumatic.   Mouth/Throat: No oropharyngeal exudate.   Mucous membranes dry   Eyes: Pupils are equal, round, and reactive to light. Conjunctivae are normal. Right eye exhibits no discharge. No scleral icterus.   Neck: Neck supple. No JVD present. No thyromegaly present.   Cardiovascular: Intact distal pulses.   No murmur heard.  Pulses:       Dorsalis pedis pulses are 2+ on the right side and 2+ on the left side.   Cap refill < 3 s   Pulmonary/Chest: Effort normal and breath sounds normal. No stridor. No respiratory distress. He has no wheezes. He has no rales.   Abdominal: Soft. Bowel sounds are normal. He exhibits no distension. There is no abdominal tenderness. There is no rebound.   Musculoskeletal: Normal range of motion.         General: No edema.   Neurological: He is alert and oriented to person, place, and time.   Skin: Skin is warm and dry. He is not diaphoretic. No erythema.   Psychiatric: He has a normal mood and affect. His behavior is normal. Thought content normal.   Nursing note and vitals reviewed.      Laboratory:  Recent Labs     03/25/20  1220   WBC 15.7*   RBC 5.19   HEMOGLOBIN 15.9   HEMATOCRIT 47.6   MCV 91.7   MCH 30.6   MCHC 33.4*   RDW 45.6   PLATELETCT 225   MPV 11.1     Recent Labs     03/25/20  1220   SODIUM 133*   POTASSIUM 4.3   CHLORIDE 92*   CO2 18*   GLUCOSE 153*   BUN 20    CREATININE 1.95*   CALCIUM 8.8     Recent Labs     03/25/20  1220   ALTSGPT 32   ASTSGOT 43   ALKPHOSPHAT 67   TBILIRUBIN 1.7*   GLUCOSE 153*               Urinalysis:          Imaging:  CT-CTA CHEST PULMONARY ARTERY W/ RECONS   Final Result      Severely degraded by breathing motion artifact      No pulmonary embolism detected to the lobar levels. More peripheral characterization is not possible due to the artifact      Coronary artery disease and mild cardiac enlargement      Hepatic steatosis      DX-CHEST-PORTABLE (1 VIEW)   Final Result      No evidence of acute cardiopulmonary process.          Assessment/Plan:  I anticipate this patient will require at least two midnights for appropriate medical management, necessitating inpatient admission.    * Severe sepsis (HCC)  Assessment & Plan  This is Severe Sepsis Present on admission  SIRS criteria identified on my evaluation include: Fever, with temperature greater than 101 deg F, Tachycardia, with heart rate greater than 90 BPM and Leukocyosis, with WBC greater than 12,000  Source of infection unclear, COVID rule out vs Bacterial pnuemonia?  Clinical indicators of end organ dysfunction include Systolic blood pressure (SBP) <90 mmHg or mean arterial pressure <65 mmHg and Creatinine >2.0 (Without ESRD or CKD)  Sepsis protocol initiated  Fluid resuscitation ordered per protocol  IV antibiotics as appropriate for source of sepsis  Reassessment: I have reassessed the patient's hemodynamic status  End organ dysfunction include(s):  Acute respiratory failure      Shock (HCC)  Assessment & Plan  Suspect this is 2/2 to bacterial, but cannot rule out viral infection?  As above.  IV Levophed gtt started, titrate MAP>65 and/or SBP>95mmHg.  As above, IV antibiotics  ICU level of care.    Lactic acidosis  Assessment & Plan  2/2 to dehydration, sepsis  Trend q3 hrs until normalization.     Acute renal failure (ARF) (HCC)  Assessment & Plan  No prior labs to compare however  patient does look intravascular dry.  IV fluids started  recheck bmp in the am.     Hypertension  Assessment & Plan  He is on lisinopril 10mg at home, will hold off in light of renal failure.  We will utilize IV labetalol.       VTE prophylaxis: Prophylaxis: SCDs    Discussed patient condition and risk of morbidity and/or mortality  patient Pharmacy,  Nursing at bedside.  The patient remains critically ill.  Critical care time = 45 minutes in directly providing and coordinating critical care and extensive data review.  No time overlap and excludes procedures.          This note was generated using voice recognition software which has a chance of producing errors of grammar and content.  I have made every reasonable attempt to find and correct any errors, but it should be expected that some may not be found prior to finalization of this note.

## 2020-03-25 NOTE — ED NOTES
Patient to CT in mobile CT unit on monitor and oxygen. Patient returned to room and in stable condition.

## 2020-03-26 ENCOUNTER — APPOINTMENT (OUTPATIENT)
Dept: RADIOLOGY | Facility: MEDICAL CENTER | Age: 70
DRG: 871 | End: 2020-03-26
Attending: INTERNAL MEDICINE
Payer: MEDICARE

## 2020-03-26 PROBLEM — R65.21 SEPSIS DUE TO GRAM-NEGATIVE ORGANISM WITH SEPTIC SHOCK (HCC): Status: ACTIVE | Noted: 2020-03-25

## 2020-03-26 PROBLEM — A41.50 SEPSIS DUE TO GRAM-NEGATIVE ORGANISM WITH SEPTIC SHOCK (HCC): Status: ACTIVE | Noted: 2020-03-25

## 2020-03-26 LAB
ALBUMIN SERPL BCP-MCNC: 3.2 G/DL (ref 3.2–4.9)
ALBUMIN/GLOB SERPL: 1.2 G/DL
ALP SERPL-CCNC: 53 U/L (ref 30–99)
ALT SERPL-CCNC: 57 U/L (ref 2–50)
ANION GAP SERPL CALC-SCNC: 14 MMOL/L (ref 7–16)
AST SERPL-CCNC: 66 U/L (ref 12–45)
BILIRUB SERPL-MCNC: 1.1 MG/DL (ref 0.1–1.5)
BUN SERPL-MCNC: 31 MG/DL (ref 8–22)
CALCIUM SERPL-MCNC: 8.6 MG/DL (ref 8.4–10.2)
CHLORIDE SERPL-SCNC: 100 MMOL/L (ref 96–112)
CO2 SERPL-SCNC: 22 MMOL/L (ref 20–33)
CREAT SERPL-MCNC: 1.69 MG/DL (ref 0.5–1.4)
GLOBULIN SER CALC-MCNC: 2.6 G/DL (ref 1.9–3.5)
GLUCOSE SERPL-MCNC: 123 MG/DL (ref 65–99)
LACTATE BLD-SCNC: 2 MMOL/L (ref 0.5–2)
LACTATE BLD-SCNC: 2.8 MMOL/L (ref 0.5–2)
POTASSIUM SERPL-SCNC: 3.8 MMOL/L (ref 3.6–5.5)
PROT SERPL-MCNC: 5.8 G/DL (ref 6–8.2)
SODIUM SERPL-SCNC: 136 MMOL/L (ref 135–145)

## 2020-03-26 PROCEDURE — C1751 CATH, INF, PER/CENT/MIDLINE: HCPCS

## 2020-03-26 PROCEDURE — 99291 CRITICAL CARE FIRST HOUR: CPT | Mod: 25 | Performed by: INTERNAL MEDICINE

## 2020-03-26 PROCEDURE — 36556 INSERT NON-TUNNEL CV CATH: CPT | Performed by: INTERNAL MEDICINE

## 2020-03-26 PROCEDURE — 700102 HCHG RX REV CODE 250 W/ 637 OVERRIDE(OP): Performed by: HOSPITALIST

## 2020-03-26 PROCEDURE — 02HV33Z INSERTION OF INFUSION DEVICE INTO SUPERIOR VENA CAVA, PERCUTANEOUS APPROACH: ICD-10-PCS | Performed by: RADIOLOGY

## 2020-03-26 PROCEDURE — 83605 ASSAY OF LACTIC ACID: CPT | Mod: 91

## 2020-03-26 PROCEDURE — 36556 INSERT NON-TUNNEL CV CATH: CPT

## 2020-03-26 PROCEDURE — A9270 NON-COVERED ITEM OR SERVICE: HCPCS | Performed by: HOSPITALIST

## 2020-03-26 PROCEDURE — 700105 HCHG RX REV CODE 258: Performed by: HOSPITALIST

## 2020-03-26 PROCEDURE — 36592 COLLECT BLOOD FROM PICC: CPT

## 2020-03-26 PROCEDURE — 700111 HCHG RX REV CODE 636 W/ 250 OVERRIDE (IP): Performed by: HOSPITALIST

## 2020-03-26 PROCEDURE — 700111 HCHG RX REV CODE 636 W/ 250 OVERRIDE (IP): Performed by: INTERNAL MEDICINE

## 2020-03-26 PROCEDURE — 80053 COMPREHEN METABOLIC PANEL: CPT

## 2020-03-26 PROCEDURE — 700105 HCHG RX REV CODE 258: Performed by: INTERNAL MEDICINE

## 2020-03-26 PROCEDURE — 770022 HCHG ROOM/CARE - ICU (200)

## 2020-03-26 RX ORDER — DIPHENHYDRAMINE HCL 25 MG
25 TABLET ORAL EVERY 6 HOURS PRN
Status: DISCONTINUED | OUTPATIENT
Start: 2020-03-26 | End: 2020-03-29 | Stop reason: HOSPADM

## 2020-03-26 RX ORDER — SODIUM CHLORIDE, SODIUM LACTATE, POTASSIUM CHLORIDE, CALCIUM CHLORIDE 600; 310; 30; 20 MG/100ML; MG/100ML; MG/100ML; MG/100ML
500 INJECTION, SOLUTION INTRAVENOUS ONCE
Status: COMPLETED | OUTPATIENT
Start: 2020-03-26 | End: 2020-03-26

## 2020-03-26 RX ORDER — SODIUM CHLORIDE, SODIUM LACTATE, POTASSIUM CHLORIDE, CALCIUM CHLORIDE 600; 310; 30; 20 MG/100ML; MG/100ML; MG/100ML; MG/100ML
1000 INJECTION, SOLUTION INTRAVENOUS ONCE
Status: COMPLETED | OUTPATIENT
Start: 2020-03-26 | End: 2020-03-26

## 2020-03-26 RX ORDER — HEPARIN SODIUM 5000 [USP'U]/ML
5000 INJECTION, SOLUTION INTRAVENOUS; SUBCUTANEOUS EVERY 8 HOURS
Status: DISCONTINUED | OUTPATIENT
Start: 2020-03-26 | End: 2020-03-29 | Stop reason: HOSPADM

## 2020-03-26 RX ADMIN — AZITHROMYCIN MONOHYDRATE 500 MG: 250 TABLET ORAL at 06:21

## 2020-03-26 RX ADMIN — SODIUM CHLORIDE, POTASSIUM CHLORIDE, SODIUM LACTATE AND CALCIUM CHLORIDE: 600; 310; 30; 20 INJECTION, SOLUTION INTRAVENOUS at 07:56

## 2020-03-26 RX ADMIN — SENNOSIDES AND DOCUSATE SODIUM 2 TABLET: 8.6; 5 TABLET ORAL at 17:54

## 2020-03-26 RX ADMIN — SODIUM CHLORIDE, POTASSIUM CHLORIDE, SODIUM LACTATE AND CALCIUM CHLORIDE 500 ML: 600; 310; 30; 20 INJECTION, SOLUTION INTRAVENOUS at 15:19

## 2020-03-26 RX ADMIN — ACETAMINOPHEN 650 MG: 325 TABLET, FILM COATED ORAL at 22:37

## 2020-03-26 RX ADMIN — AMPICILLIN SODIUM AND SULBACTAM SODIUM 3 G: 2; 1 INJECTION, POWDER, FOR SOLUTION INTRAMUSCULAR; INTRAVENOUS at 06:21

## 2020-03-26 RX ADMIN — SODIUM CHLORIDE, POTASSIUM CHLORIDE, SODIUM LACTATE AND CALCIUM CHLORIDE: 600; 310; 30; 20 INJECTION, SOLUTION INTRAVENOUS at 21:23

## 2020-03-26 RX ADMIN — CEFEPIME 2 G: 2 INJECTION, POWDER, FOR SOLUTION INTRAVENOUS at 08:31

## 2020-03-26 RX ADMIN — DIPHENHYDRAMINE HCL 25 MG: 25 TABLET ORAL at 21:18

## 2020-03-26 RX ADMIN — SODIUM CHLORIDE, POTASSIUM CHLORIDE, SODIUM LACTATE AND CALCIUM CHLORIDE: 600; 310; 30; 20 INJECTION, SOLUTION INTRAVENOUS at 00:33

## 2020-03-26 RX ADMIN — HEPARIN SODIUM 5000 UNITS: 5000 INJECTION, SOLUTION INTRAVENOUS; SUBCUTANEOUS at 14:18

## 2020-03-26 RX ADMIN — SODIUM CHLORIDE, POTASSIUM CHLORIDE, SODIUM LACTATE AND CALCIUM CHLORIDE 1000 ML: 600; 310; 30; 20 INJECTION, SOLUTION INTRAVENOUS at 02:31

## 2020-03-26 RX ADMIN — HEPARIN SODIUM 5000 UNITS: 5000 INJECTION, SOLUTION INTRAVENOUS; SUBCUTANEOUS at 08:37

## 2020-03-26 RX ADMIN — SODIUM CHLORIDE, POTASSIUM CHLORIDE, SODIUM LACTATE AND CALCIUM CHLORIDE 1000 ML: 600; 310; 30; 20 INJECTION, SOLUTION INTRAVENOUS at 00:33

## 2020-03-26 RX ADMIN — ACETAMINOPHEN 650 MG: 325 TABLET, FILM COATED ORAL at 09:14

## 2020-03-26 RX ADMIN — HEPARIN SODIUM 5000 UNITS: 5000 INJECTION, SOLUTION INTRAVENOUS; SUBCUTANEOUS at 21:18

## 2020-03-26 RX ADMIN — SODIUM CHLORIDE, POTASSIUM CHLORIDE, SODIUM LACTATE AND CALCIUM CHLORIDE: 600; 310; 30; 20 INJECTION, SOLUTION INTRAVENOUS at 14:20

## 2020-03-26 ASSESSMENT — ENCOUNTER SYMPTOMS
PSYCHIATRIC NEGATIVE: 1
FEVER: 0
EYES NEGATIVE: 1
CARDIOVASCULAR NEGATIVE: 1
GASTROINTESTINAL NEGATIVE: 1
RESPIRATORY NEGATIVE: 1
CHILLS: 0
MUSCULOSKELETAL NEGATIVE: 1
WEIGHT LOSS: 0
NEUROLOGICAL NEGATIVE: 1

## 2020-03-26 NOTE — PROGRESS NOTES
Updated Dr Mcclain that pts BP still low despite fluid bolus. Per Dr Mcclain check bladder scan and if less than 400 give 2nd  Liter LR bolus.    Bladder scan 260, 2nd bolus given.

## 2020-03-26 NOTE — PROCEDURES
Central Line Insertion  Date/Time: 3/26/2020 7:56 AM  Performed by: Umesh Bellamy M.D.  Authorized by: Umesh Bellamy M.D.     Consent:     Consent obtained:  Written    Consent given by:  Patient    Risks discussed:  Arterial puncture, incorrect placement, nerve damage, pneumothorax, infection and bleeding    Alternatives discussed:  No treatment, delayed treatment and alternative treatment  Pre-procedure details:     Hand hygiene: Hand hygiene performed prior to insertion      Sterile barrier technique: All elements of maximal sterile technique followed      Skin preparation:  2% chlorhexidine  Sedation:     Sedation type:  None  Anesthesia:     Anesthesia method:  Local infiltration    Local anesthetic:  Lidocaine 1% w/o epi  Procedure details:     Location:  R internal jugular    Patient position:  Trendelenburg    Procedural supplies:  Triple lumen    Catheter size:  7.5 Fr    Landmarks identified: yes      Ultrasound guidance: yes      Sterile ultrasound techniques: Sterile gel and sterile probe covers were used      Number of attempts:  1  Post-procedure details:     Post-procedure:  Dressing applied and line sutured    Assessment:  Blood return through all ports and no pneumothorax on x-ray    Patient tolerance of procedure:  Tolerated well, no immediate complications

## 2020-03-26 NOTE — PROGRESS NOTES
Critical Care Progress Note    Date of admission  3/25/2020    Chief Complaint  69 y.o. male admitted 3/25/2020 with septic shock.     Hospital Course  69 y.o. male who presented 3/25/2020 with severe sepsis. Patient with no significant past medical history presents with fever/chills. Patient recently returned from [place] on 03/13 and stayed in [place], [place] prior to returning back to [place]. He state feeling ill with decrease po intake arrival back home. Three days ago he state having subjective fever/chills and night sweats. No running nose or recent sick contact. No direct contact confirmed COVID case. He was recently seen at an urgent care yesterday for similar complaints which he was ruled out for influenza and advised to seek medical attention if symptoms does not improve.     In the ED, labs notable for WBC 15.7, lymphopenia, Na 133, Cl 92, CO2 18, creatinine 1.95, and lactic acid of 5.2. CXR showed no dense consolidation. He underwent CTA chest showing no evidence of central PE or dense consolidation. He was given 600 mL of IVF which his lactate improved to 2.9. Critical care consulted for ICU admission. At that time, vitals were /62, , RR 23, and SpO2 95% on 1 liter NC. Bedside echo showed collapsible IVC on sniff test, suggestive of volume responsive. He was given 1.4 liters and repeat lactic acid went up to 3.1 and BP came down to ~90/40 which patient was triaged to ICU.     Interval Problem Update  Reviewed last 24 hour events:  -- Received ~6 liters and last repeat lactic acid remains 2.7  -- On levophed 4 mcg    -- RIJ placed this morning   -- Blood culture positive for GNR    -- Patient state feeling better. He feels like he has more energy and is less short of breath.     Review of Systems  Review of Systems   Constitutional: Negative for chills, fever and weight loss.   HENT: Negative.    Eyes: Negative.    Respiratory: Negative.    Cardiovascular: Negative.    Gastrointestinal:  Negative.    Genitourinary: Negative.    Musculoskeletal: Negative.    Skin: Negative.    Neurological: Negative.    Endo/Heme/Allergies: Negative.    Psychiatric/Behavioral: Negative.    All other systems reviewed and are negative.       Vital Signs for last 24 hours   Temp:  [35.8 °C (96.5 °F)-37.3 °C (99.1 °F)] 36.1 °C (96.9 °F)  Pulse:  [] 62  Resp:  [8-44] 16  BP: ()/(49-72) 101/69  SpO2:  [89 %-100 %] 97 %         Physical Exam   Physical Exam  Constitutional:       General: He is not in acute distress.     Appearance: Normal appearance. He is not ill-appearing.   HENT:      Head: Normocephalic.      Nose: Nose normal.   Eyes:      Extraocular Movements: Extraocular movements intact.      Pupils: Pupils are equal, round, and reactive to light.   Neck:      Musculoskeletal: Normal range of motion and neck supple.   Cardiovascular:      Rate and Rhythm: Normal rate and regular rhythm.      Heart sounds: No murmur. No friction rub. No gallop.    Pulmonary:      Effort: Pulmonary effort is normal. No respiratory distress.      Breath sounds: Normal breath sounds. No stridor. No wheezing or rhonchi.   Abdominal:      General: Abdomen is flat. Bowel sounds are normal. There is no distension.      Palpations: Abdomen is soft.   Musculoskeletal: Normal range of motion.      Right lower leg: No edema.      Left lower leg: No edema.   Skin:     General: Skin is warm.      Capillary Refill: Capillary refill takes more than 3 seconds.   Neurological:      General: No focal deficit present.      Mental Status: He is alert.   Psychiatric:         Mood and Affect: Mood normal.         Medications  Current Facility-Administered Medications   Medication Dose Route Frequency Provider Last Rate Last Dose   • senna-docusate (PERICOLACE or SENOKOT S) 8.6-50 MG per tablet 2 Tab  2 Tab Oral BID Beck Art M.D.        And   • polyethylene glycol/lytes (MIRALAX) PACKET 1 Packet  1 Packet Oral QDAY PRN Beck  ARMANDO Art        And   • magnesium hydroxide (MILK OF MAGNESIA) suspension 30 mL  30 mL Oral QDAY PRN Beck Art M.D.        And   • bisacodyl (DULCOLAX) suppository 10 mg  10 mg Rectal QDAY PRN Beck Art M.D.       • Respiratory Therapy Consult   Nebulization Continuous RT Beck Art M.D.       • lactated ringers infusion (BOLUS): BMI greater than 30  30 mL/kg (Ideal) Intravenous Once PRN Beck Art M.D.       • lactated ringers infusion   Intravenous Continuous Beck Art M.D. 125 mL/hr at 03/26/20 0033     • acetaminophen (TYLENOL) tablet 650 mg  650 mg Oral Q6HRS PRN Beck Art M.D.       • ampicillin/sulbactam (UNASYN) 3 g in  mL IVPB  3 g Intravenous Q6HRS Beck Art M.D.   Stopped at 03/26/20 0651   • azithromycin (ZITHROMAX) tablet 500 mg  500 mg Oral DAILY Beck Art M.D.   500 mg at 03/26/20 0621   • ondansetron (ZOFRAN) syringe/vial injection 4 mg  4 mg Intravenous Q4HRS PRN Beck Art M.D.       • ondansetron (ZOFRAN ODT) dispertab 4 mg  4 mg Oral Q4HRS PRN Beck Art M.D.       • norepinephrine (LEVOPHED) 8 mg in  mL Infusion  0-30 mcg/min Intravenous Continuous Beck Art M.D. 7.5 mL/hr at 03/26/20 0500 4 mcg/min at 03/26/20 0500       Fluids    Intake/Output Summary (Last 24 hours) at 3/26/2020 0746  Last data filed at 3/26/2020 0651  Gross per 24 hour   Intake 3762.69 ml   Output 650 ml   Net 3112.69 ml       Laboratory          Recent Labs     03/25/20  1220   SODIUM 133*   POTASSIUM 4.3   CHLORIDE 92*   CO2 18*   BUN 20   CREATININE 1.95*   CALCIUM 8.8     Recent Labs     03/25/20  1220   ALTSGPT 32   ASTSGOT 43   ALKPHOSPHAT 67   TBILIRUBIN 1.7*   GLUCOSE 153*     Recent Labs     03/25/20  1220   WBC 15.7*   NEUTSPOLYS 92.80*   LYMPHOCYTES 3.80*   MONOCYTES 1.90   EOSINOPHILS 0.30   BASOPHILS 0.40   ASTSGOT 43   ALTSGPT 32   ALKPHOSPHAT 67   TBILIRUBIN 1.7*     Recent Labs     03/25/20  1220    RBC 5.19   HEMOGLOBIN 15.9   HEMATOCRIT 47.6   PLATELETCT 225   PROTHROMBTM 13.7   INR 1.04       Imaging  CXR personally reviewed -> RIJ in place with no dense consolidation or pleural effusion.     Assessment/Plan  * Sepsis due to Gram-negative organism with septic shock (HCC)  Assessment & Plan  This is Severe Sepsis Present on admission  SIRS criteria identified on my evaluation include: Fever, with temperature greater than 101 deg F, Tachycardia, with heart rate greater than 90 BPM and Tachypnea, with respirations greater than 20 per minute  Source of infection is lung   Clinical indicators of end organ dysfunction include Systolic blood pressure (SBP) <90 mmHg or mean arterial pressure <65 mmHg  Sepsis protocol initiated  Fluid resuscitation ordered per protocol  IV antibiotics as appropriate for source of sepsis  Reassessment: I have reassessed the patient's hemodynamic status  End organ dysfunction include(s):  Acute kidney failure      Acute respiratory failure with hypoxia (HCC)  Assessment & Plan  -- Secondary to sepsis induce lung injury   -- Repeat CXR showed no new dense infiltrate, arguing against a true bacterial PNA   -- Pending COVID 19 rule out   -- Goal SPo2> 88%     Lactic acidosis  Assessment & Plan  -- Likely secondary to sepsis and uncontrolled source of infection    -- Lactate is not clearing which may be related to untreated source of infection with MDR risk factors  -- Repeat lactic acid   -- Abx switched to cefepime     Acute renal failure (ARF) (HCC)  Assessment & Plan  -- Secondary to poor po intake and gram negative yasmin septic shock   -- Cont aggressive IVF resuscitation   -- On levophed to maintain MAP goal >65  -- Avoid nephrotoxic agents   -- Monitor UOP   -- Daily BMP          VTE:  Heparin  Ulcer: Not Indicated  Lines: Central Line  Ongoing indication addressed and [place] Catheter  Ongoing indication addressed    I have performed a physical exam and reviewed and updated ROS and  Plan today (3/26/2020). In review of yesterday's note (3/25/2020), there are no changes except as documented above.     Discussed patient condition and risk of morbidity and/or mortality with RN, RT, ,  and Patient     The patient remains critically ill on levophed requiring active titration to maintain MAP goal > 65.  Critical care time = 45 minutes in directly providing and coordinating critical care and extensive data review.  No time overlap and excludes procedures.

## 2020-03-26 NOTE — FLOWSHEET NOTE
03/25/20 1902   Vital Signs   Pulse 90   Respiration (!) 21   Pulse Oximetry 94 %   Respiratory Assessment   Level of Consciousness Alert   Chest Exam   Work Of Breathing / Effort Moderate   Breath Sounds   RUL Breath Sounds Clear;Diminished   RML Breath Sounds Diminished;Clear   RLL Breath Sounds Clear;Diminished   ISADORA Breath Sounds Diminished;Clear   LLL Breath Sounds Clear;Diminished   Secretions   Cough Dry   Oxygen   O2 (LPM) 3.5  (increased due to moderate SOB)   O2 Delivery Device Nasal Cannula   Smoking History   Have you ever smoked Yes   Have you smoked in the last 12 months No   Confirm Quit Date 03/25/00

## 2020-03-26 NOTE — PROGRESS NOTES
Rosalva from Lab called with critical result of BC positive for gram neg rods at 2111. Critical lab result read back to Rosalva.   Dr. Art notified of critical lab result at 2115.  Critical lab result read back by Dr. Art. No new orders placed at this time.     Updated Dr Art on pts low blood pressures. Per Dr Art ok to run Levophed through pts peripheral IV for now.

## 2020-03-26 NOTE — ASSESSMENT & PLAN NOTE
-- Secondary to poor po intake and gram negative yasmin septic shock   -- Improving with fluids    -- Avoid nephrotoxic agents   -- Monitor UOP   -- Daily BMP

## 2020-03-26 NOTE — PROGRESS NOTES
Received report from ED RN Sarah, pt admitted to unit. VSS, mild tachypnea on  2L nc, denies pain, discussed plan of care. Call light within reach.

## 2020-03-26 NOTE — PROGRESS NOTES
1930: Received report and assumed care of pt. Pt is alert and resting in bed. Updated pts wife on his condition. VSS at this time. ICU monitoring in place.

## 2020-03-26 NOTE — ASSESSMENT & PLAN NOTE
-- Likely secondary to sepsis and uncontrolled source of infection    -- Lactate is not clearing which may be related to untreated source of infection with MDR risk factors  -- Repeat lactic acid   -- Abx switched to cefepime

## 2020-03-26 NOTE — ASSESSMENT & PLAN NOTE
-- Secondary to sepsis induce lung injury   -- Repeat CXR showed no new dense infiltrate, arguing against a true bacterial PNA   -- Pending COVID 19 rule out   -- Goal SPo2> 88%

## 2020-03-26 NOTE — PROGRESS NOTES
Spoke with Dr Mcclain regarding pts low BP an pressor therapy through peripheral IV. Per Dr Mcclain give 1 L LR now and reevaluate BP needs at that time.

## 2020-03-26 NOTE — RESPIRATORY CARE
COPD EDUCATION by COPD CLINICAL EDUCATOR  3/26/2020 at 7:12 AM by Meghann Giles RRT     Patient reviewed by COPD education team. Patient does not have a history or diagnosis of COPD and is a non-smoker, therefore does not qualify for the COPD program.

## 2020-03-26 NOTE — PROGRESS NOTES
Received report for Amelia HARTLEY, assumed care of pt. Lines and gtts verified. Pt resting comfortably on room air, denies pain. VSS, discussed plan of care with pt. Call light within reach.

## 2020-03-26 NOTE — FLOWSHEET NOTE
03/25/20 1900   Patient History   Pulmonary Diagnosis none per patient   Home O2 No   Nocturnal CPAP No   Home Treatments/Frequency No   COPD Risk Screening   Do you have a history of COPD? No   COPD Population Screener   During the past 4 weeks, how much did you feel short of breath? 0   Do you ever cough up any mucus or phlegm? 0   In the past 12 months, you do less than you used to because of your breathing problems 0   Have you smoked at least 100 cigarettes in your entire life? 2   How old are you? 1   COPD Screening Score 4   COPD Coordinator Recommended Yes

## 2020-03-26 NOTE — ASSESSMENT & PLAN NOTE
This is Severe Sepsis Present on admission  SIRS criteria identified on my evaluation include: Fever, with temperature greater than 101 deg F, Tachycardia, with heart rate greater than 90 BPM and Tachypnea, with respirations greater than 20 per minute  Source of infection is lung   Clinical indicators of end organ dysfunction include Systolic blood pressure (SBP) <90 mmHg or mean arterial pressure <65 mmHg  Sepsis protocol initiated  Fluid resuscitation ordered per protocol  IV antibiotics as appropriate for source of sepsis  Reassessment: I have reassessed the patient's hemodynamic status  End organ dysfunction include(s):  Acute kidney failure

## 2020-03-26 NOTE — ED NOTES
Juliann - wife is approved to be informed of changes and share health information by patient.   Home phone  [phone]  Cell phone [phone]

## 2020-03-26 NOTE — CONSULTS
Critical Care Consultation    Date of consult: 3/25/2020    Referring Physician  Ori Trevizo M.D.    Reason for Consultation  Severe sepsis     History of Presenting Illness  69 y.o. male who presented 3/25/2020 with severe sepsis. Patient with no significant past medical history presents with fever/chills. Patient recently returned from [place] on 03/13 and stayed in [place], [place] prior to returning back to [place]. He state feeling ill with decrease po intake arrival back home. Three days ago he state having subjective fever/chills and night sweats. No running nose or recent sick contact. No direct contact confirmed COVID case. He was recently seen at an urgent care yesterday for similar complaints which he was ruled out for influenza and advised to seek medical attention if symptoms does not improve.    In the ED, labs notable for WBC 15.7, lymphopenia, Na 133, Cl 92, CO2 18, creatinine 1.95, and lactic acid of 5.2. CXR showed no dense consolidation. He underwent CTA chest showing no evidence of central PE or dense consolidation. He was given 600 mL of IVF which his lactate improved to 2.9. Critical care consulted for ICU admission. At that time, vitals were /62, , RR 23, and SpO2 95% on 1 liter NC. Bedside echo showed collapsible IVC on sniff test, suggestive of volume responsive. He was given 1.4 liters and repeat lactic acid went up to 3.1 and BP came down to ~90/40 which patient was triaged to ICU.         Code Status  Full Code    Review of Systems  Review of Systems   Constitutional: Positive for chills, fever, malaise/fatigue and weight loss.   HENT: Negative.    Eyes: Negative.    Respiratory: Negative for cough, hemoptysis, sputum production and shortness of breath.    Cardiovascular: Negative.    Gastrointestinal: Negative.    Genitourinary: Negative.    Musculoskeletal: Negative.    Skin: Negative.    Neurological: Negative.    Endo/Heme/Allergies: Negative.    Psychiatric/Behavioral:  Negative.    All other systems reviewed and are negative.      Past Medical History   has no past medical history on file.    Surgical History   has no past surgical history on file.    Family History  family history is not on file.    Social History   reports that he has never smoked. He has never used smokeless tobacco.    Medications  Home Medications    **Home medications have not yet been reviewed for this encounter**       Current Facility-Administered Medications   Medication Dose Route Frequency Provider Last Rate Last Dose   • [COMPLETED] AMPICILLIN-SULBACTAM SODIUM 3 (2-1) G INJ SOLR            • senna-docusate (PERICOLACE or SENOKOT S) 8.6-50 MG per tablet 2 Tab  2 Tab Oral BID Beck Art M.D.        And   • polyethylene glycol/lytes (MIRALAX) PACKET 1 Packet  1 Packet Oral QDAY PRN Beck Art M.D.        And   • magnesium hydroxide (MILK OF MAGNESIA) suspension 30 mL  30 mL Oral QDAY PRN Beck Art M.D.        And   • bisacodyl (DULCOLAX) suppository 10 mg  10 mg Rectal QDAY PRN Beck Art M.D.       • Respiratory Therapy Consult   Nebulization Continuous RT Beck Art M.D.       • lactated ringers infusion (BOLUS)  1,000 mL Intravenous Once PRN Beck Art M.D.       • lactated ringers infusion (BOLUS): BMI greater than 30  30 mL/kg (Ideal) Intravenous Once PRN Beck Art M.D.       • lactated ringers infusion   Intravenous Continuous Beck Art M.D.       • acetaminophen (TYLENOL) tablet 650 mg  650 mg Oral Q6HRS PRN Beck Art M.D.       • ampicillin/sulbactam (UNASYN) 3 g in  mL IVPB  3 g Intravenous Q6HRS Beck Art M.D.       • azithromycin (ZITHROMAX) tablet 500 mg  500 mg Oral DAILY Beck Art M.D.       • ondansetron (ZOFRAN) syringe/vial injection 4 mg  4 mg Intravenous Q4HRS PRN Beck Art M.D.       • ondansetron (ZOFRAN ODT) dispertab 4 mg  4 mg Oral Q4HRS PRN Beck Art M.D.       •  labetalol (NORMODYNE/TRANDATE) injection 10 mg  10 mg Intravenous Q4HRS PRN Beck Art M.D.         No current outpatient medications on file.       Allergies  No Known Allergies    Vital Signs last 24 hours  Temp:  [37.3 °C (99.1 °F)] 37.3 °C (99.1 °F)  Pulse:  [] 95  Resp:  [22-28] 25  BP: ()/(53-68) 93/55  SpO2:  [89 %-96 %] 94 %    Physical Exam  Physical Exam  Constitutional:       Appearance: He is normal weight. He is ill-appearing and toxic-appearing.   HENT:      Head: Normocephalic.      Nose: Nose normal.   Eyes:      Extraocular Movements: Extraocular movements intact.      Pupils: Pupils are equal, round, and reactive to light.   Neck:      Musculoskeletal: Normal range of motion and neck supple.   Cardiovascular:      Rate and Rhythm: Regular rhythm. Tachycardia present.      Pulses: Normal pulses.   Pulmonary:      Comments: Mild tachypnea to the low 20s with no evidence of accessory muscles usage     Abdominal:      General: Abdomen is flat. Bowel sounds are normal. There is no distension.      Palpations: Abdomen is soft. There is no mass.      Tenderness: There is no abdominal tenderness.   Musculoskeletal: Normal range of motion.      Right lower leg: No edema.      Left lower leg: No edema.   Skin:     General: Skin is dry.      Capillary Refill: Capillary refill takes 2 to 3 seconds.   Neurological:      General: No focal deficit present.      Mental Status: He is alert and oriented to person, place, and time.      Cranial Nerves: No cranial nerve deficit.   Psychiatric:         Mood and Affect: Mood normal.         Fluids    Intake/Output Summary (Last 24 hours) at 3/25/2020 1723  Last data filed at 3/25/2020 1512  Gross per 24 hour   Intake 100 ml   Output --   Net 100 ml       Laboratory  Recent Results (from the past 48 hour(s))   POCT Influenza A/B    Collection Time: 03/24/20  3:04 PM   Result Value Ref Range    Rapid Influenza A-B neg     Internal Control Positive  Valid     Internal Control Negative Valid    EKG    Collection Time: 20 12:12 PM   Result Value Ref Range    Report       [facility] Emergency Dept.    Test Date:  2020  Pt Name:    FELI HOLLOWAY               Department: [facility]  MRN:        [id number]                      [facility]  Gender:     Male                         Technician: [id number]  :        1950                   Requested By:ER TRIAGE PROTOCOL  Order #:    [id number]                    Reading MD:    Measurements  Intervals                                Axis  Rate:       116                          P:          75  MD:         164                          QRS:        -91  QRSD:       114                          T:          63  QT:         340  QTc:        473    Interpretive Statements  SINUS TACHYCARDIA  INCOMPLETE RBBB AND LAFB  No previous ECG available for comparison     CBC WITH DIFFERENTIAL    Collection Time: 20 12:20 PM   Result Value Ref Range    WBC 15.7 (H) 4.8 - 10.8 K/uL    RBC 5.19 4.70 - 6.10 M/uL    Hemoglobin 15.9 14.0 - 18.0 g/dL    Hematocrit 47.6 42.0 - 52.0 %    MCV 91.7 81.4 - 97.8 fL    MCH 30.6 27.0 - 33.0 pg    MCHC 33.4 (L) 33.7 - 35.3 g/dL    RDW 45.6 35.9 - 50.0 fL    Platelet Count 225 164 - 446 K/uL    MPV 11.1 9.0 - 12.9 fL    Neutrophils-Polys 92.80 (H) 44.00 - 72.00 %    Lymphocytes 3.80 (L) 22.00 - 41.00 %    Monocytes 1.90 0.00 - 13.40 %    Eosinophils 0.30 0.00 - 6.90 %    Basophils 0.40 0.00 - 1.80 %    Immature Granulocytes 0.80 0.00 - 0.90 %    Nucleated RBC 0.00 /100 WBC    Neutrophils (Absolute) 14.54 (H) 1.82 - 7.42 K/uL    Lymphs (Absolute) 0.60 (L) 1.00 - 4.80 K/uL    Monos (Absolute) 0.30 0.00 - 0.85 K/uL    Eos (Absolute) 0.04 0.00 - 0.51 K/uL    Baso (Absolute) 0.07 0.00 - 0.12 K/uL    Immature Granulocytes (abs) 0.13 (H) 0.00 - 0.11 K/uL    NRBC (Absolute) 0.00 K/uL   COMP METABOLIC PANEL    Collection Time: 20 12:20 PM   Result Value Ref  Range    Sodium 133 (L) 135 - 145 mmol/L    Potassium 4.3 3.6 - 5.5 mmol/L    Chloride 92 (L) 96 - 112 mmol/L    Co2 18 (L) 20 - 33 mmol/L    Anion Gap 23.0 (H) 7.0 - 16.0    Glucose 153 (H) 65 - 99 mg/dL    Bun 20 8 - 22 mg/dL    Creatinine 1.95 (H) 0.50 - 1.40 mg/dL    Calcium 8.8 8.4 - 10.2 mg/dL    AST(SGOT) 43 12 - 45 U/L    ALT(SGPT) 32 2 - 50 U/L    Alkaline Phosphatase 67 30 - 99 U/L    Total Bilirubin 1.7 (H) 0.1 - 1.5 mg/dL    Albumin 3.9 3.2 - 4.9 g/dL    Total Protein 6.9 6.0 - 8.2 g/dL    Globulin 3.0 1.9 - 3.5 g/dL    A-G Ratio 1.3 g/dL   TROPONIN    Collection Time: 03/25/20 12:20 PM   Result Value Ref Range    Troponin T 33 (H) 6 - 19 ng/L   D-DIMER    Collection Time: 03/25/20 12:20 PM   Result Value Ref Range    D-Dimer Screen 6.37 (H) 0.00 - 0.50 ug/mL (FEU)   proBrain Natriuretic Peptide, NT    Collection Time: 03/25/20 12:20 PM   Result Value Ref Range    NT-proBNP 620 (H) 0 - 125 pg/mL   LACTIC ACID    Collection Time: 03/25/20 12:20 PM   Result Value Ref Range    Lactic Acid 5.2 (HH) 0.5 - 2.0 mmol/L   ESTIMATED GFR    Collection Time: 03/25/20 12:20 PM   Result Value Ref Range    GFR If  41 (A) >60 mL/min/1.73 m 2    GFR If Non  34 (A) >60 mL/min/1.73 m 2   PROCALCITONIN    Collection Time: 03/25/20 12:20 PM   Result Value Ref Range    Procalcitonin 6.94 (H) <0.25 ng/mL   Prothrombin time (INR)    Collection Time: 03/25/20 12:20 PM   Result Value Ref Range    PT 13.7 12.0 - 14.6 sec    INR 1.04 0.87 - 1.13   Influenza A/B By PCR (Adult - Flu Only)    Collection Time: 03/25/20  1:01 PM   Result Value Ref Range    Influenza virus A RNA Negative Negative    Influenza virus B, PCR Negative Negative   URINALYSIS    Collection Time: 03/25/20  1:04 PM   Result Value Ref Range    Color Yellow     Character Clear     Ph 5.5 5.0 - 8.0    Glucose Negative Negative mg/dL    Ketones 15 (A) Negative mg/dL    Protein 100 (A) Negative mg/dL    Bilirubin Moderate (A) Negative     Nitrite Negative Negative    Leukocyte Esterase Negative Negative    Occult Blood Negative Negative    Micro Urine Req Microscopic    REFRACTOMETER SG    Collection Time: 03/25/20  1:04 PM   Result Value Ref Range    Specific Gravity 1.025    URINE MICROSCOPIC (W/UA)    Collection Time: 03/25/20  1:04 PM   Result Value Ref Range    WBC 2-5 (A) /hpf    RBC Rare /hpf    Bacteria Rare (A) None /hpf    Epithelial Cells Few Few /hpf    Mucous Threads Rare /hpf    Urine Crystals Few Amorphous /hpf    Urine Casts 0-2 Granular /lpf   LACTIC ACID    Collection Time: 03/25/20  2:57 PM   Result Value Ref Range    Lactic Acid 2.9 (H) 0.5 - 2.0 mmol/L   Lactic Acid -STAT Once    Collection Time: 03/25/20  4:57 PM   Result Value Ref Range    Lactic Acid 3.1 (H) 0.5 - 2.0 mmol/L       Imaging  CXR personally reviewed: no dense consolidation or pleural effusion     CTA chest personally reviewed -> no evidence of central PE or dense consolidation with significant motion artifact.        Assessment/Plan  * Severe sepsis (HCC)  Assessment & Plan  This is Severe Sepsis Present on admission  SIRS criteria identified on my evaluation include: Fever, with temperature greater than 101 deg F, Tachycardia, with heart rate greater than 90 BPM and Tachypnea, with respirations greater than 20 per minute  Source of infection is lung   Clinical indicators of end organ dysfunction include Systolic blood pressure (SBP) <90 mmHg or mean arterial pressure <65 mmHg  Sepsis protocol initiated  Fluid resuscitation ordered per protocol  IV antibiotics as appropriate for source of sepsis  Reassessment: I have reassessed the patient's hemodynamic status  End organ dysfunction include(s):  Acute kidney failure      Acute respiratory failure with hypoxia (HCC)  Assessment & Plan  -- Concern for viral vs bacterial PNA given clinical presentation   -- Other differentials include acute peripheral PE but appears less likely at this time   -- Cont CAP rx as  above   -- Pending COVID 19 rule out       Lactic acidosis  Assessment & Plan  -- Likely secondary to poor po intake and sepsis   -- Rise in lactate from 2.9 to 3.1 is likely due to under fluid resuscitated   -- Cont IVF and repeat lactic acid in 4 hours   -- Cont serial POCUS   -- Abx as above     Acute renal failure (ARF) (HCC)  Assessment & Plan  -- Present on admission. Consider etiology to be secondary to poor po intake and sepsis leading to hypovolemia.    -- Cont aggressive IVF resuscitation   -- Avoid nephrotoxic agents   -- Monitor UOP   -- Daily BMP       COVID ICU    Isolation Initiated: 03/25/2020 from the ED     Exposures:   Direct contact to confirmed COVID: No    Travel History: [place] 03/6-03/13    Diagnosis: clinical suspicion    Interval Evaluation: 3/25/2020   Oxygen Delivery: low flow oxygen        Multiorgan dysfunction/failure: Acute respiratory failure and Acute kidney failure   Antibiotics: unasyn/azithromycin    Fluid balance goal: +1-2 liters    Code Status: Full Code    Tips:  • Minimize exposure: consider SQ lovenox rather than Heparin BID/TID; avoid non-essential medication administration (statin, etc); evaluate need for vital sign frequency, etc.  • Fluids: resuscitation or maintenance IVF administration when necessary (i.e septic, dehydrated, not tolerating PO, etc). Excessive fluid administration is associated with worse outcomes with COVID.  • Treatments: Bronchodilators only when clinically indicated. Consider LABA/ICS or LAMA inhaler first before nebulizer. Critical shortage of albuterol MDI.  • Therapeutics: Avoid NSAIDs, steroids, ACEi/ARB. Tylenol preferred for fever.      Discussed patient condition and risk of morbidity and/or mortality with Hospitalist, RN, RT, Charge nurse / hot rounds and Patient.      The patient remains critically ill, impending shock requiring active IVF resuscitation.  Critical care time = 35 minutes in directly providing and coordinating critical care  and extensive data review.  No time overlap and excludes procedures.

## 2020-03-27 ENCOUNTER — APPOINTMENT (OUTPATIENT)
Dept: RADIOLOGY | Facility: MEDICAL CENTER | Age: 70
DRG: 871 | End: 2020-03-27
Attending: INTERNAL MEDICINE
Payer: MEDICARE

## 2020-03-27 PROBLEM — B34.2 CORONAVIRUS INFECTION, UNSPECIFIED: Status: ACTIVE | Noted: 2020-03-27

## 2020-03-27 PROBLEM — R79.89 ABNORMAL LFTS: Status: ACTIVE | Noted: 2020-03-27

## 2020-03-27 PROBLEM — E87.20 LACTIC ACIDOSIS: Status: RESOLVED | Noted: 2020-03-25 | Resolved: 2020-03-27

## 2020-03-27 LAB
ALBUMIN SERPL BCP-MCNC: 3 G/DL (ref 3.2–4.9)
ALP SERPL-CCNC: 76 U/L (ref 30–99)
ALT SERPL-CCNC: 57 U/L (ref 2–50)
ANION GAP SERPL CALC-SCNC: 12 MMOL/L (ref 7–16)
AST SERPL-CCNC: 64 U/L (ref 12–45)
BACTERIA UR CULT: NORMAL
BASOPHILS # BLD AUTO: 0.5 % (ref 0–1.8)
BASOPHILS # BLD: 0.05 K/UL (ref 0–0.12)
BILIRUB CONJ SERPL-MCNC: 0.4 MG/DL (ref 0.1–0.5)
BILIRUB INDIRECT SERPL-MCNC: 0.5 MG/DL (ref 0–1)
BILIRUB SERPL-MCNC: 0.9 MG/DL (ref 0.1–1.5)
BUN SERPL-MCNC: 34 MG/DL (ref 8–22)
CALCIUM SERPL-MCNC: 8.5 MG/DL (ref 8.4–10.2)
CHLORIDE SERPL-SCNC: 104 MMOL/L (ref 96–112)
CO2 SERPL-SCNC: 22 MMOL/L (ref 20–33)
CREAT SERPL-MCNC: 1.45 MG/DL (ref 0.5–1.4)
EOSINOPHIL # BLD AUTO: 0.04 K/UL (ref 0–0.51)
EOSINOPHIL NFR BLD: 0.4 % (ref 0–6.9)
ERYTHROCYTE [DISTWIDTH] IN BLOOD BY AUTOMATED COUNT: 44.9 FL (ref 35.9–50)
GLUCOSE SERPL-MCNC: 91 MG/DL (ref 65–99)
HAV IGM SERPL QL IA: NORMAL
HBV CORE IGM SER QL: NORMAL
HBV SURFACE AG SER QL: NORMAL
HCT VFR BLD AUTO: 38.4 % (ref 42–52)
HCV AB SER QL: NORMAL
HGB BLD-MCNC: 13 G/DL (ref 14–18)
IMM GRANULOCYTES # BLD AUTO: 0.07 K/UL (ref 0–0.11)
IMM GRANULOCYTES NFR BLD AUTO: 0.7 % (ref 0–0.9)
LYMPHOCYTES # BLD AUTO: 0.87 K/UL (ref 1–4.8)
LYMPHOCYTES NFR BLD: 8.7 % (ref 22–41)
MCH RBC QN AUTO: 30.5 PG (ref 27–33)
MCHC RBC AUTO-ENTMCNC: 33.9 G/DL (ref 33.7–35.3)
MCV RBC AUTO: 90.1 FL (ref 81.4–97.8)
MONOCYTES # BLD AUTO: 0.97 K/UL (ref 0–0.85)
MONOCYTES NFR BLD AUTO: 9.7 % (ref 0–13.4)
NEUTROPHILS # BLD AUTO: 8.04 K/UL (ref 1.82–7.42)
NEUTROPHILS NFR BLD: 80 % (ref 44–72)
NRBC # BLD AUTO: 0 K/UL
NRBC BLD-RTO: 0 /100 WBC
PLATELET # BLD AUTO: 135 K/UL (ref 164–446)
PMV BLD AUTO: 13.3 FL (ref 9–12.9)
POTASSIUM SERPL-SCNC: 3.9 MMOL/L (ref 3.6–5.5)
PROT SERPL-MCNC: 5.9 G/DL (ref 6–8.2)
RBC # BLD AUTO: 4.26 M/UL (ref 4.7–6.1)
SARS-COV-2 RNA RESP QL NAA+PROBE: NOT DETECTED
SIGNIFICANT IND 70042: NORMAL
SITE SITE: NORMAL
SODIUM SERPL-SCNC: 138 MMOL/L (ref 135–145)
SOURCE SOURCE: NORMAL
SPECIMEN SOURCE: NORMAL
WBC # BLD AUTO: 10 K/UL (ref 4.8–10.8)

## 2020-03-27 PROCEDURE — 85025 COMPLETE CBC W/AUTO DIFF WBC: CPT

## 2020-03-27 PROCEDURE — 76700 US EXAM ABDOM COMPLETE: CPT

## 2020-03-27 PROCEDURE — 700111 HCHG RX REV CODE 636 W/ 250 OVERRIDE (IP): Performed by: INTERNAL MEDICINE

## 2020-03-27 PROCEDURE — 770006 HCHG ROOM/CARE - MED/SURG/GYN SEMI*

## 2020-03-27 PROCEDURE — 87040 BLOOD CULTURE FOR BACTERIA: CPT

## 2020-03-27 PROCEDURE — 700105 HCHG RX REV CODE 258: Performed by: INTERNAL MEDICINE

## 2020-03-27 PROCEDURE — 700101 HCHG RX REV CODE 250: Performed by: INTERNAL MEDICINE

## 2020-03-27 PROCEDURE — A9270 NON-COVERED ITEM OR SERVICE: HCPCS | Performed by: HOSPITALIST

## 2020-03-27 PROCEDURE — 700105 HCHG RX REV CODE 258: Performed by: HOSPITALIST

## 2020-03-27 PROCEDURE — 700102 HCHG RX REV CODE 250 W/ 637 OVERRIDE(OP): Performed by: HOSPITALIST

## 2020-03-27 PROCEDURE — 80076 HEPATIC FUNCTION PANEL: CPT

## 2020-03-27 PROCEDURE — 80074 ACUTE HEPATITIS PANEL: CPT

## 2020-03-27 PROCEDURE — 80048 BASIC METABOLIC PNL TOTAL CA: CPT

## 2020-03-27 PROCEDURE — 99233 SBSQ HOSP IP/OBS HIGH 50: CPT | Performed by: INTERNAL MEDICINE

## 2020-03-27 PROCEDURE — 36415 COLL VENOUS BLD VENIPUNCTURE: CPT

## 2020-03-27 RX ORDER — TRAZODONE HYDROCHLORIDE 50 MG/1
50 TABLET ORAL NIGHTLY PRN
Status: DISCONTINUED | OUTPATIENT
Start: 2020-03-27 | End: 2020-03-29 | Stop reason: HOSPADM

## 2020-03-27 RX ADMIN — HEPARIN SODIUM 5000 UNITS: 5000 INJECTION, SOLUTION INTRAVENOUS; SUBCUTANEOUS at 13:13

## 2020-03-27 RX ADMIN — HEPARIN SODIUM 5000 UNITS: 5000 INJECTION, SOLUTION INTRAVENOUS; SUBCUTANEOUS at 21:30

## 2020-03-27 RX ADMIN — METRONIDAZOLE 500 MG: 500 INJECTION, SOLUTION INTRAVENOUS at 08:05

## 2020-03-27 RX ADMIN — HEPARIN SODIUM 5000 UNITS: 5000 INJECTION, SOLUTION INTRAVENOUS; SUBCUTANEOUS at 05:44

## 2020-03-27 RX ADMIN — TRAZODONE HYDROCHLORIDE 50 MG: 50 TABLET ORAL at 00:19

## 2020-03-27 RX ADMIN — CEFEPIME 2 G: 2 INJECTION, POWDER, FOR SOLUTION INTRAVENOUS at 05:44

## 2020-03-27 RX ADMIN — TRAZODONE HYDROCHLORIDE 50 MG: 50 TABLET ORAL at 21:30

## 2020-03-27 RX ADMIN — SODIUM CHLORIDE, POTASSIUM CHLORIDE, SODIUM LACTATE AND CALCIUM CHLORIDE: 600; 310; 30; 20 INJECTION, SOLUTION INTRAVENOUS at 05:44

## 2020-03-27 ASSESSMENT — ENCOUNTER SYMPTOMS
PSYCHIATRIC NEGATIVE: 1
NEUROLOGICAL NEGATIVE: 1
MUSCULOSKELETAL NEGATIVE: 1
WEIGHT LOSS: 0
CHILLS: 0
RESPIRATORY NEGATIVE: 1
EYES NEGATIVE: 1
CARDIOVASCULAR NEGATIVE: 1
FEVER: 0
GASTROINTESTINAL NEGATIVE: 1

## 2020-03-27 ASSESSMENT — COGNITIVE AND FUNCTIONAL STATUS - GENERAL
DAILY ACTIVITIY SCORE: 24
SUGGESTED CMS G CODE MODIFIER DAILY ACTIVITY: CH
MOBILITY SCORE: 24
SUGGESTED CMS G CODE MODIFIER MOBILITY: CH

## 2020-03-27 NOTE — CARE PLAN
Problem: Communication  Goal: The ability to communicate needs accurately and effectively will improve  Outcome: PROGRESSING AS EXPECTED     Problem: Communication  Goal: The ability to communicate needs accurately and effectively will improve  Outcome: PROGRESSING AS EXPECTED     Problem: Safety  Goal: Will remain free from injury  Outcome: PROGRESSING AS EXPECTED     Problem: Bowel/Gastric:  Goal: Normal bowel function is maintained or improved  Outcome: PROGRESSING AS EXPECTED

## 2020-03-27 NOTE — PROGRESS NOTES
Receive report from ODILIA Cisneros, assumed care of pt. Patient resting comfortably on room air, denies pain or needs at this time. VSS. Discussed plan of care with patient. Call light within reach.

## 2020-03-27 NOTE — CONSULTS
Referral: COVID negative. Please assess for floor transfer. Thank you.    Chief complaint:    Chief Complaint   Patient presents with   • Shortness of Breath     Date of admission:  3/25/2020  Date of consult:  3/27/2020  Travel History:   Primary residence:  [address] [place] [id number]  Direct contact with confirmed COVID:   Oxygen support: room air   Imaging:  No pulmonary infiltrates or consolidations are noted.  WBC (K/ul):  10   Absolute Lymphocyte Count (ALC):  870   Meet [facility] PUI Screening criteria? Patient already ruled-out COVID negative     Recommendation:     --- Pr ruled-out - no need for ICU care per intensive care provider -   Continue droplet/contact/eye until discharge unless new guidedance given     Infection Prevention called?  :  No

## 2020-03-27 NOTE — ASSESSMENT & PLAN NOTE
-- Concern for acute cholecystitis given GNR bacteremia with no clear source of infection   -- Check RUQ and repeat LFTs today   -- No complaints of RUQ/epigastric pain   -- Check hepatitis panel

## 2020-03-27 NOTE — PROGRESS NOTES
Critical Care Progress Note    Date of admission  3/25/2020    Chief Complaint  69 y.o. male admitted 3/25/2020 with septic shock.     Hospital Course  69 y.o. male who presented 3/25/2020 with severe sepsis. Patient with no significant past medical history presents with fever/chills. Patient recently returned from [place] on 03/13 and stayed in [place], [place] prior to returning back to [place]. He state feeling ill with decrease po intake arrival back home. Three days ago he state having subjective fever/chills and night sweats. No running nose or recent sick contact. No direct contact confirmed COVID case. He was recently seen at an urgent care yesterday for similar complaints which he was ruled out for influenza and advised to seek medical attention if symptoms does not improve.     In the ED, labs notable for WBC 15.7, lymphopenia, Na 133, Cl 92, CO2 18, creatinine 1.95, and lactic acid of 5.2. CXR showed no dense consolidation. He underwent CTA chest showing no evidence of central PE or dense consolidation. He was given 600 mL of IVF which his lactate improved to 2.9. Critical care consulted for ICU admission. At that time, vitals were /62, , RR 23, and SpO2 95% on 1 liter NC. Bedside echo showed collapsible IVC on sniff test, suggestive of volume responsive. He was given 1.4 liters and repeat lactic acid went up to 3.1 and BP came down to ~90/40 which patient was triaged to ICU.    03/26: RIJ CVC placed. On levophed 2-4 mcg. Received additional fluid bolus. Lactate cleared. Off oxygen therapy.      Interval Problem Update  Reviewed last 24 hour events:  -- Lactate cleared    -- Received additional fluids and off levophed  -- Creatinine downtrending   -- Pending RUQ US given elevated LFTs    -- Pending COVID 19 rule out   -- Pending identification and susceptibility for GNR bacteremia   -- Patient state feeling better. Denies chest pain, SOB, fever/chills, N/V, diarrhea. Appetite still poor.         Review of Systems  Review of Systems   Constitutional: Negative for chills, fever and weight loss.   HENT: Negative.    Eyes: Negative.    Respiratory: Negative.    Cardiovascular: Negative.    Gastrointestinal: Negative.    Genitourinary: Negative.    Musculoskeletal: Negative.    Skin: Negative.    Neurological: Negative.    Endo/Heme/Allergies: Negative.    Psychiatric/Behavioral: Negative.    All other systems reviewed and are negative.       Vital Signs for last 24 hours   Temp:  [36.1 °C (96.9 °F)-36.8 °C (98.2 °F)] 36.6 °C (97.8 °F)  Pulse:  [52-71] 60  Resp:  [10-41] 18  BP: ()/(41-71) 133/65  SpO2:  [91 %-99 %] 97 %         Physical Exam   Physical Exam  Constitutional:       General: He is not in acute distress.     Appearance: Normal appearance. He is not ill-appearing.   HENT:      Head: Normocephalic.      Nose: Nose normal.   Eyes:      Extraocular Movements: Extraocular movements intact.      Pupils: Pupils are equal, round, and reactive to light.   Neck:      Musculoskeletal: Normal range of motion and neck supple.   Cardiovascular:      Rate and Rhythm: Normal rate and regular rhythm.      Heart sounds: No murmur. No friction rub. No gallop.    Pulmonary:      Effort: Pulmonary effort is normal. No respiratory distress.      Breath sounds: Normal breath sounds. No stridor. No wheezing or rhonchi.   Abdominal:      General: Abdomen is flat. Bowel sounds are normal. There is no distension.      Palpations: Abdomen is soft.   Musculoskeletal: Normal range of motion.      Right lower leg: No edema.      Left lower leg: No edema.   Skin:     General: Skin is warm.      Capillary Refill: Capillary refill takes more than 3 seconds.   Neurological:      General: No focal deficit present.      Mental Status: He is alert.   Psychiatric:         Mood and Affect: Mood normal.         Medications  Current Facility-Administered Medications   Medication Dose Route Frequency Provider Last Rate Last Dose   •  traZODone (DESYREL) tablet 50 mg  50 mg Oral HS PRN Maura Mcclain M.D.   50 mg at 03/27/20 0019   • heparin injection 5,000 Units  5,000 Units Subcutaneous Q8HRS Umesh Bellamy M.D.   5,000 Units at 03/27/20 0544   • cefepime (MAXIPIME) 2 g in  mL IVPB  2 g Intravenous DAILY Umesh Bellamy M.D.   Stopped at 03/27/20 0614   • diphenhydrAMINE (BENADRYL) tablet/capsule 25 mg  25 mg Oral Q6HRS PRN Beck Art M.D.   25 mg at 03/26/20 2118   • senna-docusate (PERICOLACE or SENOKOT S) 8.6-50 MG per tablet 2 Tab  2 Tab Oral BID Beck Art M.D.   2 Tab at 03/26/20 1754    And   • polyethylene glycol/lytes (MIRALAX) PACKET 1 Packet  1 Packet Oral QDAY PRN Beck Art M.D.        And   • magnesium hydroxide (MILK OF MAGNESIA) suspension 30 mL  30 mL Oral QDAY PRN Beck Art M.D.        And   • bisacodyl (DULCOLAX) suppository 10 mg  10 mg Rectal QDAY PRN Beck Art M.D.       • Respiratory Therapy Consult   Nebulization Continuous RT Beck Art M.D.       • lactated ringers infusion (BOLUS): BMI greater than 30  30 mL/kg (Ideal) Intravenous Once PRN Beck Art M.D.       • lactated ringers infusion   Intravenous Continuous Beck Art M.D. 125 mL/hr at 03/27/20 0544     • acetaminophen (TYLENOL) tablet 650 mg  650 mg Oral Q6HRS PRN Beck Art M.D.   650 mg at 03/26/20 2237   • ondansetron (ZOFRAN) syringe/vial injection 4 mg  4 mg Intravenous Q4HRS PRN Beck Art M.D.       • ondansetron (ZOFRAN ODT) dispertab 4 mg  4 mg Oral Q4HRS PRN Beck Art M.D.       • norepinephrine (LEVOPHED) 8 mg in  mL Infusion  0-30 mcg/min Intravenous Continuous Umesh Bellamy M.D.   Stopped at 03/27/20 0046       Fluids    Intake/Output Summary (Last 24 hours) at 3/27/2020 0720  Last data filed at 3/27/2020 0145  Gross per 24 hour   Intake 3569.13 ml   Output 1050 ml   Net 2519.13 ml       Laboratory          Recent Labs     03/25/20  1220 03/26/20  0731  03/27/20  0430   SODIUM 133* 136 138   POTASSIUM 4.3 3.8 3.9   CHLORIDE 92* 100 104   CO2 18* 22 22   BUN 20 31* 34*   CREATININE 1.95* 1.69* 1.45*   CALCIUM 8.8 8.6 8.5     Recent Labs     03/25/20  1220 03/26/20  0745 03/27/20  0430   ALTSGPT 32 57*  --    ASTSGOT 43 66*  --    ALKPHOSPHAT 67 53  --    TBILIRUBIN 1.7* 1.1  --    GLUCOSE 153* 123* 91     Recent Labs     03/25/20  1220 03/26/20  0745   WBC 15.7*  --    NEUTSPOLYS 92.80*  --    LYMPHOCYTES 3.80*  --    MONOCYTES 1.90  --    EOSINOPHILS 0.30  --    BASOPHILS 0.40  --    ASTSGOT 43 66*   ALTSGPT 32 57*   ALKPHOSPHAT 67 53   TBILIRUBIN 1.7* 1.1     Recent Labs     03/25/20  1220   RBC 5.19   HEMOGLOBIN 15.9   HEMATOCRIT 47.6   PLATELETCT 225   PROTHROMBTM 13.7   INR 1.04       Imaging  No new imaging      Assessment/Plan  * Sepsis due to Gram-negative organism with septic shock (HCC)  Assessment & Plan  This is Severe Sepsis Present on admission  SIRS criteria identified on my evaluation include: Fever, with temperature greater than 101 deg F, Tachycardia, with heart rate greater than 90 BPM and Tachypnea, with respirations greater than 20 per minute  Source of infection is lung   Clinical indicators of end organ dysfunction include Systolic blood pressure (SBP) <90 mmHg or mean arterial pressure <65 mmHg  Sepsis protocol initiated  Fluid resuscitation ordered per protocol  IV antibiotics as appropriate for source of sepsis  Reassessment: I have reassessed the patient's hemodynamic status  End organ dysfunction include(s):  Acute kidney failure      Abnormal LFTs  Assessment & Plan  -- Concern for acute cholecystitis given GNR bacteremia with no clear source of infection   -- Check RUQ and repeat LFTs today   -- No complaints of RUQ/epigastric pain   -- Check hepatitis panel       Coronavirus infection, unspecified  Assessment & Plan  -- Concern for COVID 19 given recent travel history to [place]   -- Expect results today  -- Will consult COVID team for  further evaluation depending on testing result   -- Cont PPE and airborne       Acute respiratory failure with hypoxia (HCC)  Assessment & Plan  -- Secondary to sepsis induce lung injury   -- Repeat CXR showed no new dense infiltrate, arguing against a true bacterial PNA   -- Pending COVID 19 rule out   -- Goal SPo2> 88%     Acute renal failure (ARF) (HCC)  Assessment & Plan  -- Secondary to poor po intake and gram negative yasmin septic shock   -- Improving with fluids    -- Avoid nephrotoxic agents   -- Monitor UOP   -- Daily BMP          VTE:  Heparin  Ulcer: Not Indicated  Lines: Central Line  DC today if remains off pressor for 24 hours     I have performed a physical exam and reviewed and updated ROS and Plan today (3/27/2020). In review of yesterday's note (3/26/2020), there are no changes except as documented above.     Discussed patient condition and risk of morbidity and/or mortality with RN, RT, ,  and Patient

## 2020-03-27 NOTE — PROGRESS NOTES
Pt to transfer out of unit to 207. Gave report to RN Will, plan of care, assessment and medications discussed. VSS, pt on room air.

## 2020-03-27 NOTE — PROGRESS NOTES
Pt arrived to floor from ICU. Assumed care of patient. Discussed daily plan of care, oriented patient to floor. VSS. Pt awake and alert, denies needs or complaints at this time. Pt denies pain or nausea. Hourly rounding in place.

## 2020-03-27 NOTE — ASSESSMENT & PLAN NOTE
-- Concern for COVID 19 given recent travel history to [place]   -- Expect results today  -- Will consult COVID team for further evaluation depending on testing result   -- Cont PPE and airborne

## 2020-03-27 NOTE — CARE PLAN
Problem: Safety  Goal: Will remain free from injury  Outcome: PROGRESSING AS EXPECTED  Intervention: Provide assistance with mobility  Note: SBA; pt uses call light appropriately     Problem: Respiratory:  Goal: Respiratory status will improve  Outcome: PROGRESSING AS EXPECTED  Intervention: Assess and monitor pulmonary status  Note: Pt remains on RA. Diminished breath sounds in lower lobes. No tachypnea or SOB.

## 2020-03-28 LAB
BACTERIA BLD CULT: ABNORMAL
SIGNIFICANT IND 70042: ABNORMAL
SIGNIFICANT IND 70042: ABNORMAL
SITE SITE: ABNORMAL
SITE SITE: ABNORMAL
SOURCE SOURCE: ABNORMAL
SOURCE SOURCE: ABNORMAL

## 2020-03-28 PROCEDURE — 770006 HCHG ROOM/CARE - MED/SURG/GYN SEMI*

## 2020-03-28 PROCEDURE — 99233 SBSQ HOSP IP/OBS HIGH 50: CPT | Performed by: INTERNAL MEDICINE

## 2020-03-28 PROCEDURE — A9270 NON-COVERED ITEM OR SERVICE: HCPCS | Performed by: HOSPITALIST

## 2020-03-28 PROCEDURE — 700102 HCHG RX REV CODE 250 W/ 637 OVERRIDE(OP): Performed by: INTERNAL MEDICINE

## 2020-03-28 PROCEDURE — 700111 HCHG RX REV CODE 636 W/ 250 OVERRIDE (IP): Performed by: INTERNAL MEDICINE

## 2020-03-28 PROCEDURE — 700102 HCHG RX REV CODE 250 W/ 637 OVERRIDE(OP): Performed by: HOSPITALIST

## 2020-03-28 PROCEDURE — A9270 NON-COVERED ITEM OR SERVICE: HCPCS | Performed by: INTERNAL MEDICINE

## 2020-03-28 PROCEDURE — 700105 HCHG RX REV CODE 258: Performed by: INTERNAL MEDICINE

## 2020-03-28 RX ORDER — CIPROFLOXACIN 500 MG/1
500 TABLET, FILM COATED ORAL EVERY 12 HOURS
Status: DISCONTINUED | OUTPATIENT
Start: 2020-03-28 | End: 2020-03-29 | Stop reason: HOSPADM

## 2020-03-28 RX ORDER — ROPINIROLE 0.5 MG/1
0.5 TABLET, FILM COATED ORAL 3 TIMES DAILY PRN
Status: DISCONTINUED | OUTPATIENT
Start: 2020-03-28 | End: 2020-03-29 | Stop reason: HOSPADM

## 2020-03-28 RX ADMIN — TRAZODONE HYDROCHLORIDE 50 MG: 50 TABLET ORAL at 22:20

## 2020-03-28 RX ADMIN — ROPINIROLE HYDROCHLORIDE 0.5 MG: 0.5 TABLET, FILM COATED ORAL at 20:47

## 2020-03-28 RX ADMIN — HEPARIN SODIUM 5000 UNITS: 5000 INJECTION, SOLUTION INTRAVENOUS; SUBCUTANEOUS at 22:20

## 2020-03-28 RX ADMIN — CIPROFLOXACIN HYDROCHLORIDE 500 MG: 500 TABLET, FILM COATED ORAL at 22:20

## 2020-03-28 RX ADMIN — ROPINIROLE HYDROCHLORIDE 0.5 MG: 0.5 TABLET, FILM COATED ORAL at 11:13

## 2020-03-28 RX ADMIN — CEFEPIME 2 G: 2 INJECTION, POWDER, FOR SOLUTION INTRAVENOUS at 05:24

## 2020-03-28 RX ADMIN — HEPARIN SODIUM 5000 UNITS: 5000 INJECTION, SOLUTION INTRAVENOUS; SUBCUTANEOUS at 05:24

## 2020-03-28 RX ADMIN — CIPROFLOXACIN HYDROCHLORIDE 500 MG: 500 TABLET, FILM COATED ORAL at 11:13

## 2020-03-28 RX ADMIN — HEPARIN SODIUM 5000 UNITS: 5000 INJECTION, SOLUTION INTRAVENOUS; SUBCUTANEOUS at 14:21

## 2020-03-28 ASSESSMENT — ENCOUNTER SYMPTOMS
DIZZINESS: 0
BLURRED VISION: 0
SENSORY CHANGE: 0
FEVER: 0
SORE THROAT: 0
ABDOMINAL PAIN: 0
HEADACHES: 0
NAUSEA: 0
WEAKNESS: 0
DEPRESSION: 0
PHOTOPHOBIA: 0
VOMITING: 0
INSOMNIA: 0
COUGH: 0
SPEECH CHANGE: 0
CHILLS: 0
HEARTBURN: 0
CLAUDICATION: 0
SHORTNESS OF BREATH: 0
DIARRHEA: 0
CONSTIPATION: 0
NERVOUS/ANXIOUS: 0
MYALGIAS: 0

## 2020-03-28 NOTE — CARE PLAN
Problem: Communication  Goal: The ability to communicate needs accurately and effectively will improve  Outcome: PROGRESSING AS EXPECTED     Problem: Safety  Goal: Will remain free from injury  Outcome: PROGRESSING AS EXPECTED  Goal: Will remain free from falls  Outcome: PROGRESSING AS EXPECTED     Problem: Infection  Goal: Will remain free from infection  Outcome: PROGRESSING AS EXPECTED     Problem: Venous Thromboembolism (VTW)/Deep Vein Thrombosis (DVT) Prevention:  Goal: Patient will participate in Venous Thrombosis (VTE)/Deep Vein Thrombosis (DVT)Prevention Measures  Outcome: PROGRESSING AS EXPECTED     Problem: Knowledge Deficit  Goal: Knowledge of the prescribed therapeutic regimen will improve  Outcome: PROGRESSING AS EXPECTED     Problem: Respiratory:  Goal: Respiratory status will improve  Outcome: PROGRESSING AS EXPECTED

## 2020-03-28 NOTE — PROGRESS NOTES
A&Ox4, VSS, denies pain, SOB, nausea, or dizziness. POC discussed, denies further needs at this time. Safety measures and hourly rounding in place.

## 2020-03-28 NOTE — PROGRESS NOTES
[facility] Medicine Daily Progress Note    Date of Service  3/28/2020    Chief Complaint  69 y.o. male admitted 3/25/2020 with shortness of breath, fevers, chills and body aches.    Hospital Course    Patient initially admitted to ICU for sepsis and found to have gram negative bacteremia with 2/2 positive cultures.  He was on pressors and supportive care with broad spectrum antibiotics.  He was also ruled out for COVID-19.  Patient improved and out of ICU to the medical floor.      Interval Problem Update  Patient is feeling better but states he remains very tired.  The source of his gram negative bacteremia has not been found.  Cultures have grown Klebsiella sensitive to quinolones and the cefepime he is currently on.  I will de-escalate his antibiotics to PO cipro and anticipate discharge home likely tomorrow.    Consultants/Specialty  Critical care - s/o    Code Status  full    Disposition  Home likely tomorrow.    Review of Systems  Review of Systems   Constitutional: Positive for malaise/fatigue. Negative for chills and fever.   HENT: Negative for congestion and sore throat.    Eyes: Negative for blurred vision and photophobia.   Respiratory: Negative for cough and shortness of breath.    Cardiovascular: Negative for chest pain, claudication and leg swelling.   Gastrointestinal: Negative for abdominal pain, constipation, diarrhea, heartburn, nausea and vomiting.   Genitourinary: Negative for dysuria and hematuria.   Musculoskeletal: Negative for joint pain and myalgias.   Skin: Negative for itching and rash.   Neurological: Negative for dizziness, sensory change, speech change, weakness and headaches.   Psychiatric/Behavioral: Negative for depression. The patient is not nervous/anxious and does not have insomnia.         Physical Exam  Temp:  [36.7 °C (98.1 °F)-37.7 °C (99.9 °F)] 37.7 °C (99.9 °F)  Pulse:  [54-67] 65  Resp:  [15-20] 18  BP: (115-154)/(55-76) 115/55  SpO2:  [94 %-98 %] 95 %    Physical  Exam    Fluids    Intake/Output Summary (Last 24 hours) at 3/28/2020 1049  Last data filed at 3/28/2020 0520  Gross per 24 hour   Intake 1040 ml   Output 500 ml   Net 540 ml       Laboratory  Recent Labs     03/25/20  1220 03/27/20  0800   WBC 15.7* 10.0   RBC 5.19 4.26*   HEMOGLOBIN 15.9 13.0*   HEMATOCRIT 47.6 38.4*   MCV 91.7 90.1   MCH 30.6 30.5   MCHC 33.4* 33.9   RDW 45.6 44.9   PLATELETCT 225 135*   MPV 11.1 13.3*     Recent Labs     03/25/20  1220 03/26/20  0745 03/27/20  0430   SODIUM 133* 136 138   POTASSIUM 4.3 3.8 3.9   CHLORIDE 92* 100 104   CO2 18* 22 22   GLUCOSE 153* 123* 91   BUN 20 31* 34*   CREATININE 1.95* 1.69* 1.45*   CALCIUM 8.8 8.6 8.5     Recent Labs     03/25/20  1220   INR 1.04               Imaging  US-ABDOMEN COMPLETE SURVEY   Final Result      1.  No evidence of gallstone or biliary ductal dilatation.      2.  Fatty change of the liver. There is an area of focal fat sparing adjacent to the gallbladder fossa.      DX-CHEST-FOR LINE PLACEMENT Perform procedure in: Patient's Room   Final Result      Right IJ central line catheter has been placed and the tip projects appropriately over the superior vena cava.      CT-CTA CHEST PULMONARY ARTERY W/ RECONS   Final Result      Severely degraded by breathing motion artifact      No pulmonary embolism detected to the lobar levels. More peripheral characterization is not possible due to the artifact      Coronary artery disease and mild cardiac enlargement      Hepatic steatosis      DX-CHEST-PORTABLE (1 VIEW)   Final Result      No evidence of acute cardiopulmonary process.           Assessment/Plan  * Sepsis due to Gram-negative organism with septic shock (HCC)  Assessment & Plan  Sepsis criteria resolved  Gram negative bacteremia - sensitivities resulted-  Klebsiella, quinolone sensitive  UA negative for UTI  RUQ us - fatty liver changes, no biliary dilation  Repeat Blood cultures 3/27 - no growth      Abnormal LFTs  Assessment &  Plan  Hepatitis panel negative  Only mild elevation, likely transient with sepsis/shock.        Coronavirus infection, unspecified  Assessment & Plan  COVID-19 - negative      Acute respiratory failure with hypoxia (HCC)  Assessment & Plan  Room air with good saturations  Lungs clear      Shock (HCC)  Assessment & Plan  Resolved  Off pressors  Out of ICU    Acute renal failure (ARF) (HCC)  Assessment & Plan  Resolved      Hypertension  Assessment & Plan  He is on lisinopril 10mg at home, will hold off in light of renal failure.  We will utilize IV labetalol.          VTE prophylaxis: heparin

## 2020-03-28 NOTE — PROGRESS NOTES
Received report from night shift. Assumed care of patient. Patient resting in bed. Hourly rounding in place.

## 2020-03-28 NOTE — CARE PLAN
Problem: Safety  Goal: Will remain free from injury  Outcome: PROGRESSING AS EXPECTED  Note: Educated patient about wearing non slid socks. Educated patient about dangling prior to standing up.     Problem: Venous Thromboembolism (VTW)/Deep Vein Thrombosis (DVT) Prevention:  Goal: Patient will participate in Venous Thrombosis (VTE)/Deep Vein Thrombosis (DVT)Prevention Measures  Outcome: PROGRESSING AS EXPECTED  Flowsheets (Taken 3/28/2020 1025)  Pharmacologic Prophylaxis Used: Unfractionated Heparin  Note: Encouraged patient to walk around in room and to move his legs when stationary.     Problem: Respiratory:  Goal: Respiratory status will improve  Outcome: PROGRESSING AS EXPECTED  Intervention: Educate and encourage coughing and deep breathing  Note: Educated patient about taking deep breaths to expand the lungs.

## 2020-03-29 VITALS
TEMPERATURE: 99.5 F | BODY MASS INDEX: 29.75 KG/M2 | HEIGHT: 69 IN | OXYGEN SATURATION: 93 % | DIASTOLIC BLOOD PRESSURE: 55 MMHG | RESPIRATION RATE: 18 BRPM | WEIGHT: 200.84 LBS | SYSTOLIC BLOOD PRESSURE: 127 MMHG | HEART RATE: 59 BPM

## 2020-03-29 LAB
ANION GAP SERPL CALC-SCNC: 12 MMOL/L (ref 7–16)
BASOPHILS # BLD AUTO: 0.9 % (ref 0–1.8)
BASOPHILS # BLD: 0.07 K/UL (ref 0–0.12)
BUN SERPL-MCNC: 23 MG/DL (ref 8–22)
CALCIUM SERPL-MCNC: 8.5 MG/DL (ref 8.4–10.2)
CHLORIDE SERPL-SCNC: 102 MMOL/L (ref 96–112)
CO2 SERPL-SCNC: 23 MMOL/L (ref 20–33)
CREAT SERPL-MCNC: 1.3 MG/DL (ref 0.5–1.4)
EOSINOPHIL # BLD AUTO: 0.13 K/UL (ref 0–0.51)
EOSINOPHIL NFR BLD: 1.6 % (ref 0–6.9)
ERYTHROCYTE [DISTWIDTH] IN BLOOD BY AUTOMATED COUNT: 46.3 FL (ref 35.9–50)
GLUCOSE SERPL-MCNC: 91 MG/DL (ref 65–99)
HCT VFR BLD AUTO: 35.4 % (ref 42–52)
HGB BLD-MCNC: 11.9 G/DL (ref 14–18)
IMM GRANULOCYTES # BLD AUTO: 0.27 K/UL (ref 0–0.11)
IMM GRANULOCYTES NFR BLD AUTO: 3.4 % (ref 0–0.9)
LYMPHOCYTES # BLD AUTO: 1.25 K/UL (ref 1–4.8)
LYMPHOCYTES NFR BLD: 15.7 % (ref 22–41)
MCH RBC QN AUTO: 30.7 PG (ref 27–33)
MCHC RBC AUTO-ENTMCNC: 33.6 G/DL (ref 33.7–35.3)
MCV RBC AUTO: 91.5 FL (ref 81.4–97.8)
MONOCYTES # BLD AUTO: 1.06 K/UL (ref 0–0.85)
MONOCYTES NFR BLD AUTO: 13.3 % (ref 0–13.4)
NEUTROPHILS # BLD AUTO: 5.19 K/UL (ref 1.82–7.42)
NEUTROPHILS NFR BLD: 65.1 % (ref 44–72)
NRBC # BLD AUTO: 0 K/UL
NRBC BLD-RTO: 0 /100 WBC
PLATELET # BLD AUTO: 191 K/UL (ref 164–446)
PMV BLD AUTO: 12.5 FL (ref 9–12.9)
POTASSIUM SERPL-SCNC: 4.3 MMOL/L (ref 3.6–5.5)
RBC # BLD AUTO: 3.87 M/UL (ref 4.7–6.1)
SODIUM SERPL-SCNC: 137 MMOL/L (ref 135–145)
WBC # BLD AUTO: 8 K/UL (ref 4.8–10.8)

## 2020-03-29 PROCEDURE — 700102 HCHG RX REV CODE 250 W/ 637 OVERRIDE(OP): Performed by: INTERNAL MEDICINE

## 2020-03-29 PROCEDURE — 80048 BASIC METABOLIC PNL TOTAL CA: CPT

## 2020-03-29 PROCEDURE — 36415 COLL VENOUS BLD VENIPUNCTURE: CPT

## 2020-03-29 PROCEDURE — 700111 HCHG RX REV CODE 636 W/ 250 OVERRIDE (IP): Performed by: INTERNAL MEDICINE

## 2020-03-29 PROCEDURE — 99239 HOSP IP/OBS DSCHRG MGMT >30: CPT | Performed by: INTERNAL MEDICINE

## 2020-03-29 PROCEDURE — A9270 NON-COVERED ITEM OR SERVICE: HCPCS | Performed by: INTERNAL MEDICINE

## 2020-03-29 PROCEDURE — 85025 COMPLETE CBC W/AUTO DIFF WBC: CPT

## 2020-03-29 RX ORDER — CIPROFLOXACIN 500 MG/1
500 TABLET, FILM COATED ORAL EVERY 12 HOURS
Qty: 10 TAB | Refills: 0 | Status: SHIPPED | OUTPATIENT
Start: 2020-03-29 | End: 2020-04-03

## 2020-03-29 RX ORDER — ROPINIROLE 0.5 MG/1
0.5 TABLET, FILM COATED ORAL 3 TIMES DAILY PRN
Qty: 90 TAB | Refills: 0 | Status: SHIPPED | OUTPATIENT
Start: 2020-03-29

## 2020-03-29 RX ADMIN — CIPROFLOXACIN HYDROCHLORIDE 500 MG: 500 TABLET, FILM COATED ORAL at 05:25

## 2020-03-29 RX ADMIN — HEPARIN SODIUM 5000 UNITS: 5000 INJECTION, SOLUTION INTRAVENOUS; SUBCUTANEOUS at 05:25

## 2020-03-29 NOTE — CARE PLAN
Problem: Safety  Goal: Will remain free from injury  Outcome: PROGRESSING AS EXPECTED  Intervention: Provide assistance with mobility  Flowsheets (Taken 3/28/2020 2300 by Tami Navarro)  Assistance: No Assistance Required  Ambulation Tolerance: Tolerates Well  Intervention: Collaborate with Interdisciplinary Team for safe transfer and mobilization techniques  Flowsheets (Taken 3/28/2020 2300 by Tami Navarro)  Assistive Devices: None  Goal: Will remain free from falls  Outcome: PROGRESSING AS EXPECTED  Intervention: Assess risk factors for falls  Flowsheets  Taken 3/28/2020 2300 by Tami Navarro  Pt Calls for Assistance: Yes  Taken 3/29/2020 0346 by Chastity Martell [email or web address]  Mobility Status Assessment: 0-Ambulates & Transfers Independently. No Assistance Required  Intervention: Implement fall precautions  Flowsheets (Taken 3/28/2020 2031)  Environmental Precautions:   Treaded Slipper Socks on Patient   Personal Belongings, Wastebasket, Call Bell etc. in Easy Reach   Transferred to Stronger Side   Report Given to Other Health Care Providers Regarding Fall Risk   Bed in Low Position   Communication Sign for Patients & Families   Mobility Assessed & Appropriate Sign Placed     Problem: Pain Management  Goal: Pain level will decrease to patient's comfort goal  Outcome: PROGRESSING AS EXPECTED
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
## 2020-03-29 NOTE — PROGRESS NOTES
Received report from night shift. Assumed care of patient. Will monitor HR. No further needs at this time. Will continue to monitor.

## 2020-03-29 NOTE — CARE PLAN
Problem: Safety  Goal: Will remain free from injury  Outcome: PROGRESSING AS EXPECTED  Note: Encouraged use of non skid socks. Helped clean up blankets around bed.     Problem: Venous Thromboembolism (VTW)/Deep Vein Thrombosis (DVT) Prevention:  Goal: Patient will participate in Venous Thrombosis (VTE)/Deep Vein Thrombosis (DVT)Prevention Measures  Outcome: PROGRESSING AS EXPECTED  Intervention: Encourage patient to perform ankle flex, foot rotation, and knee flex exercises in addition to other prophylatic measures every hour while awake  Note: Encouraged ambulating frequently and educated about signs and symptoms of a blood clot.     Problem: Respiratory:  Goal: Respiratory status will improve  Outcome: PROGRESSING AS EXPECTED  Intervention: Educate and encourage coughing and deep breathing  Note: Encouraged deep breathing to expand the lungs

## 2020-03-29 NOTE — DISCHARGE SUMMARY
Discharge Summary    CHIEF COMPLAINT ON ADMISSION  Chief Complaint   Patient presents with   • Shortness of Breath       Reason for Admission  Chills, Shortness of Breath     Admission Date  3/25/2020    CODE STATUS  Full Code    HPI & HOSPITAL COURSE  This is a 69 y.o. male here with rigors and shortness of breath.  He was recently in [place] for vacation and admitted for septic shock.  He was  nt initially admitted to ICU for sepsis and found to have gram negative bacteremia with 2/2 positive cultures.  He was on pressors and supportive care with broad spectrum antibiotics.  He was also ruled out for COVID-19.  Patient improved and out of ICU to the medical floor.  His blood cultures have resulted positive for Klebsiella sensitive to both the cefepime he was on and cipro to which he was transitioned to.  He is on room air, good blood pressure and appropriate for discharge home.  He will complete oral antibiotics to resolve his bacteremia.  Source of bacteremia was not found as UA and abdominal sources ruled out.      Therefore, he is discharged in good and stable condition to home with close outpatient follow-up.    The patient met 2-midnight criteria for an inpatient stay at the time of discharge.    Discharge Date  3/29/2020    FOLLOW UP ITEMS POST DISCHARGE  PCP - 1-2 weeks    DISCHARGE DIAGNOSES  Principal Problem:    Sepsis due to Gram-negative organism with septic shock (HCC) POA: Unknown  Active Problems:    Hypertension POA: Unknown    Acute renal failure (ARF) (HCC) POA: Unknown    Shock (HCC) POA: Unknown    Acute respiratory failure with hypoxia (HCC) POA: Unknown    Coronavirus infection, unspecified POA: Unknown    Abnormal LFTs POA: Unknown  Resolved Problems:    Lactic acidosis POA: Unknown      FOLLOW UP  No future appointments.  your physician    In 2 weeks        MEDICATIONS ON DISCHARGE     Medication List      START taking these medications      Instructions   ciprofloxacin 500 MG Tabs  Commonly  known as:  CIPRO   Take 1 Tab by mouth every 12 hours for 5 days.  Dose:  500 mg     ROPINIRole 0.5 MG Tabs  Commonly known as:  REQUIP   Take 1 Tab by mouth 3 times a day as needed (restless legs).  Dose:  0.5 mg        CONTINUE taking these medications      Instructions   furosemide 20 MG Tabs  Commonly known as:  LASIX   Take 20 mg by mouth every day. Take 1 tablet by mouth every day  Dose:  20 mg     lisinopril 10 MG Tabs  Commonly known as:  PRINIVIL   Take 10 mg by mouth every day. Take 1 tablet by mouth every day  Dose:  10 mg            Allergies  No Known Allergies    DIET  Orders Placed This Encounter   Procedures   • Diet Order Regular     Standing Status:   Standing     Number of Occurrences:   1     Order Specific Question:   Diet:     Answer:   Regular [1]       ACTIVITY  As tolerated.  Weight bearing as tolerated    CONSULTATIONS  Critical care - ou    PROCEDURES  Right IJ central line placement - 3/26/2020    LABORATORY  Lab Results   Component Value Date    SODIUM 137 03/29/2020    POTASSIUM 4.3 03/29/2020    CHLORIDE 102 03/29/2020    CO2 23 03/29/2020    GLUCOSE 91 03/29/2020    BUN 23 (H) 03/29/2020    CREATININE 1.30 03/29/2020        Lab Results   Component Value Date    WBC 8.0 03/29/2020    HEMOGLOBIN 11.9 (L) 03/29/2020    HEMATOCRIT 35.4 (L) 03/29/2020    PLATELETCT 191 03/29/2020        Total time of the discharge process exceeds 35 minutes.

## 2020-03-29 NOTE — PROGRESS NOTES
Discharged patient. Removed IV. Patient left with personal belongings. Discharge paperwork given to patient. Escorted with RN by wheelchair to patients car.

## 2020-03-29 NOTE — DISCHARGE INSTRUCTIONS
Ciprofloxacin tablets  What is this medicine?  CIPROFLOXACIN (sip shena FLOX a sin) is a quinolone antibiotic. It is used to treat certain kinds of bacterial infections. It will not work for colds, flu, or other viral infections.  This medicine may be used for other purposes; ask your health care provider or pharmacist if you have questions.  COMMON BRAND NAME(S): Cipro  What should I tell my health care provider before I take this medicine?  They need to know if you have any of these conditions:  -bone problems  -history of low levels of potassium in the blood  -joint problems  -irregular heartbeat  -kidney disease  -myasthenia gravis  -seizures  -tendon problems  -tingling of the fingers or toes, or other nerve disorder  -an unusual or allergic reaction to ciprofloxacin, other antibiotics or medicines, foods, dyes, or preservatives  -pregnant or trying to get pregnant  -breast-feeding  How should I use this medicine?  Take this medicine by mouth with a glass of water. Follow the directions on the prescription label. Take your medicine at regular intervals. Do not take your medicine more often than directed. Take all of your medicine as directed even if you think your are better. Do not skip doses or stop your medicine early.  You can take this medicine with food or on an empty stomach. It can be taken with a meal that contains dairy or calcium, but do not take it alone with a dairy product, like milk or yogurt or calcium-fortified juice.  A special MedGuide will be given to you by the pharmacist with each prescription and refill. Be sure to read this information carefully each time.  Talk to your pediatrician regarding the use of this medicine in children. Special care may be needed.  Overdosage: If you think you have taken too much of this medicine contact a poison control center or emergency room at once.  NOTE: This medicine is only for you. Do not share this medicine with others.  What if I miss a dose?  If you  miss a dose, take it as soon as you can. If it is almost time for your next dose, take only that dose. Do not take double or extra doses.  What may interact with this medicine?  Do not take this medicine with any of the following medications:  -cisapride  -dofetilide  -dronedarone  -flibanserin  -lomitapide  -pimozide  -thioridazine  -tizanidine  -ziprasidone  This medicine may also interact with the following medications:  -antacids  -birth control pills  -caffeine  -certain medicines for diabetes, like glipizide or glyburide  -certain medicines that treat or prevent blood clots like warfarin  -clozapine  -cyclosporine  -didanosine (ddI) buffered tablets or powder  -duloxetine  -lanthanum carbonate  -lidocaine  -methotrexate  -multivitamins  -NSAIDS, medicines for pain and inflammation, like ibuprofen or naproxen  -olanzapine  -omeprazole  -other medicines that prolong the QT interval (cause an abnormal heart rhythm)  -phenytoin  -probenecid  -ropinirole  -sevelamer  -sildenafil  -sucralfate  -theophylline  -zolpidem  This list may not describe all possible interactions. Give your health care provider a list of all the medicines, herbs, non-prescription drugs, or dietary supplements you use. Also tell them if you smoke, drink alcohol, or use illegal drugs. Some items may interact with your medicine.  What should I watch for while using this medicine?  Tell your doctor or health care professional if your symptoms do not improve.  Do not treat diarrhea with over the counter products. Contact your doctor if you have diarrhea that lasts more than 2 days or if it is severe and watery.  You may get drowsy or dizzy. Do not drive, use machinery, or do anything that needs mental alertness until you know how this medicine affects you. Do not stand or sit up quickly, especially if you are an older patient. This reduces the risk of dizzy or fainting spells.  This medicine can make you more sensitive to the sun. Keep out of the  sun. If you cannot avoid being in the sun, wear protective clothing and use sunscreen. Do not use sun lamps or tanning beds/booths.  Avoid antacids, aluminum, calcium, iron, magnesium, and zinc products for 6 hours before and 2 hours after taking a dose of this medicine.  What side effects may I notice from receiving this medicine?  Side effects that you should report to your doctor or health care professional as soon as possible:  -allergic reactions like skin rash or hives, swelling of the face, lips, or tongue  -anxious  -confusion  -depressed mood  -diarrhea  -fast, irregular heartbeat  -hallucination, loss of contact with reality  -joint, muscle, or tendon pain or swelling  -pain, tingling, numbness in the hands or feet  -suicidal thoughts or other mood changes  -sunburn  -unusually weak or tired  Side effects that usually do not require medical attention (report to your doctor or health care professional if they continue or are bothersome):  -dry mouth  -headache  -nausea  -trouble sleeping  This list may not describe all possible side effects. Call your doctor for medical advice about side effects. You may report side effects to FDA at 0-786-FDA-5850.  Where should I keep my medicine?  Keep out of the reach of children.  Store at room temperature below 30 degrees C (86 degrees F). Keep container tightly closed. Throw away any unused medicine after the expiration date.  NOTE: This sheet is a summary. It may not cover all possible information. If you have questions about this medicine, talk to your doctor, pharmacist, or health care provider.  © 2018 [employer] (2017-07-28 14:42:02)    Ropinirole tablets  What is this medicine?  ROPINIROLE (shena PIN i role) is used to treat the symptoms of Parkinson's disease. It helps to improve muscle control and movement difficulties. It is also used for the treatment of Restless Legs Syndrome.  This medicine may be used for other purposes; ask your health care  provider or pharmacist if you have questions.  COMMON BRAND NAME(S): Requcherrie  What should I tell my health care provider before I take this medicine?  They need to know if you have any of these conditions:  -dizzy or fainting spells  -heart disease  -high blood pressure  -kidney disease  -liver disease  -low blood pressure  -sleeping problems  -an unusual or allergic reaction to ropinirole, other medicines, foods, dyes, or preservatives  -pregnant or trying to get pregnant  -breast-feeding  How should I use this medicine?  Take this medicine by mouth with a glass of water. Follow the directions on the prescription label. You can take it with or without food. If it upsets your stomach, take it with food. Take your doses at regular intervals. Do not take your medicine more often than directed. Do not stop taking this medicine except on your doctor's advice. Stopping this medicine too quickly may cause serious side effects.  Talk to your pediatrician regarding the use of this medicine in children. Special care may be needed.  Overdosage: If you think you have taken too much of this medicine contact a poison control center or emergency room at once.  NOTE: This medicine is only for you. Do not share this medicine with others.  What if I miss a dose?  If you miss a dose, take it as soon as you can. If it is almost time for your next dose, take only that dose. Do not take double or extra doses.  What may interact with this medicine?  -ciprofloxacin  -female hormones, like estrogens and birth control pills  -medicines for depression, anxiety, or psychotic disturbances  -metoclopramide  -mexiletine  -norfloxacin  -omeprazole  This list may not describe all possible interactions. Give your health care provider a list of all the medicines, herbs, non-prescription drugs, or dietary supplements you use. Also tell them if you smoke, drink alcohol, or use illegal drugs. Some items may interact with your medicine.  What should I  watch for while using this medicine?  Visit your doctor or health care professional for regular checks on your progress. It may be several weeks or months before you feel the full effect of this medicine.  You may get drowsy or dizzy. Do not drive, use machinery, or do anything that needs mental alertness until you know how this drug affects you. Do not stand or sit up quickly, especially if you are an older patient. This reduces the risk of dizzy or fainting spells. Alcohol can increase possible dizziness. Avoid alcoholic drinks. If you find that you have sudden feelings of wanting to sleep during normal activities, like cooking, watching television, or while driving or riding in a car, you should contact your health care professional.  Your mouth may get dry. Chewing sugarless gum or sucking hard candy, and drinking plenty of water may help. Contact your doctor if the problem does not go away or is severe.  There have been reports of increased sexual urges or other strong urges such as gambling while taking some medicines for Parkinson's disease. If you experience any of these urges while taking this medicine, you should report it to your health care provider as soon as possible.  You should check your skin often for changes to moles and new growths while taking this medicine. Call your doctor if you notice any of these changes.  What side effects may I notice from receiving this medicine?  Side effects that you should report to your doctor or health care professional as soon as possible:  -allergic reactions like skin rash, itching or hives, swelling of the face, lips, or tongue  -changes in vision  -chest pain  -confusion  -falling asleep during normal activities like driving  -fast, irregular heartbeat  -feeling faint or lightheaded, falls  -hallucination, loss of contact with reality  -joint or muscle pain  -loss of bladder control  -loss of memory  -new or increased gambling urges, sexual urges, uncontrolled  spending, binge or compulsive eating, or other urges  -pain, tingling, numbness in the hands or feet  -shortness of breath, troubled breathing, tightness in chest, or wheezing  -signs and symptoms of low blood pressure like dizziness; feeling faint or lightheaded, falls; unusually weak or tired  -swelling of the ankles, feet, hands  -uncontrollable head, mouth, neck, arm, or leg movements  -vomiting  Side effects that usually do not require medical attention (report to your doctor or health care professional if they continue or are bothersome):  -dizziness  -drowsiness  -headache  -increased sweating  -nausea  -tremors  This list may not describe all possible side effects. Call your doctor for medical advice about side effects. You may report side effects to FDA at 0-527-FDA-9184.  Where should I keep my medicine?  Keep out of the reach of children.  Store at room temperature between 20 and 25 degrees C (68 and 77 degrees F). Protect from light and moisture. Keep container tightly closed. Throw away any unused medicine after the expiration date.  NOTE: This sheet is a summary. It may not cover all possible information. If you have questions about this medicine, talk to your doctor, pharmacist, or health care provider.  © 2018 [employer] (2017-06-05 10:58:05)    Discharge Instructions    Discharged to home by car with relative. Discharged via wheelchair, hospital escort: Yes.  Special equipment needed: Not Applicable    Be sure to schedule a follow-up appointment with your primary care doctor or any specialists as instructed.     Discharge Plan:   Influenza Vaccine Indication: Not indicated: Previously immunized this influenza season and > 8 years of age    I understand that a diet low in cholesterol, fat, and sodium is recommended for good health. Unless I have been given specific instructions below for another diet, I accept this instruction as my diet prescription.   Other diet: n/a    Special  Instructions: Sepsis, Adult  Sepsis is a serious infection of your blood or tissues that affects your whole body. The infection that causes sepsis may be bacterial, viral, fungal, or parasitic. Sepsis may be life threatening. Sepsis can cause your blood pressure to drop. This may result in shock. Shock causes your central nervous system and your organs to stop working correctly.  What increases the risk?  Sepsis can happen in anyone, but it is more likely to happen in people who have weakened immune systems.  What are the signs or symptoms?  Symptoms of sepsis can include:  · Fever or low body temperature (hypothermia).  · Rapid breathing (hyperventilation).  · Chills.  · Rapid heartbeat (tachycardia).  · Confusion or light-headedness.  · Trouble breathing.  · Urinating much less than usual.  · Cool, clammy skin or red, flushed skin.  · Other problems with the heart, kidneys, or brain.  How is this diagnosed?  Your health care provider will likely do tests to look for an infection, to see if the infection has spread to your blood, and to see how serious your condition is. Tests can include:  · Blood tests, including cultures of your blood.  · Cultures of other fluids from your body, such as:  ¨ Urine.  ¨ Pus from wounds.  ¨ Mucus coughed up from your lungs.  · Urine tests other than cultures.  · X-ray exams or other imaging tests.  How is this treated?  Treatment will begin with elimination of the source of infection. If your sepsis is likely caused by a bacterial or fungal infection, you will be given antibiotic or antifungal medicines.  You may also receive:  · Oxygen.  · Fluids through an IV tube.  · Medicines to increase your blood pressure.  · A machine to clean your blood (dialysis) if your kidneys fail.  · A machine to help you breathe if your lungs fail.  Get help right away if:  You get an infection or develop any of the signs and symptoms of sepsis after surgery or a hospitalization.  This information is  not intended to replace advice given to you by your health care provider. Make sure you discuss any questions you have with your health care provider.  Document Released: 09/15/2004 Document Revised: 05/25/2017 Document Reviewed: 08/25/2014  [employer] Patient Education © 2017 [employer]      · Is patient discharged on Warfarin / Coumadin?   No     Depression / Suicide Risk    As you are discharged from this [facility] facility, it is important to learn how to keep safe from harming yourself.    Recognize the warning signs:  · Abrupt changes in personality, positive or negative- including increase in energy   · Giving away possessions  · Change in eating patterns- significant weight changes-  positive or negative  · Change in sleeping patterns- unable to sleep or sleeping all the time   · Unwillingness or inability to communicate  · Depression  · Unusual sadness, discouragement and loneliness  · Talk of wanting to die  · Neglect of personal appearance   · Rebelliousness- reckless behavior  · Withdrawal from people/activities they love  · Confusion- inability to concentrate     If you or a loved one observes any of these behaviors or has concerns about self-harm, here's what you can do:  · Talk about it- your feelings and reasons for harming yourself  · Remove any means that you might use to hurt yourself (examples: pills, rope, extension cords, firearm)  · Get professional help from the community (Mental Health, Substance Abuse, psychological counseling)  · Do not be alone:Call your Safe Contact- someone whom you trust who will be there for you.  · Call your local CRISIS HOTLINE 957-2524 or 818-784-1127  · Call your local Children's Mobile Crisis Response Team [place] (077) 300-2819 or www.[employer]ic  · Call the toll free National Suicide Prevention Hotlines   · National Suicide Prevention Lifeline 205-257-FJZD (9854)  · National Hope Line Network 800-SUICIDE (037-9147)

## 2020-04-01 LAB
BACTERIA BLD CULT: NORMAL
BACTERIA BLD CULT: NORMAL
SIGNIFICANT IND 70042: NORMAL
SIGNIFICANT IND 70042: NORMAL
SITE SITE: NORMAL
SITE SITE: NORMAL
SOURCE SOURCE: NORMAL
SOURCE SOURCE: NORMAL

## 2020-06-03 NOTE — CARE PLAN
Problem: Communication  Goal: The ability to communicate needs accurately and effectively will improve  Outcome: PROGRESSING AS EXPECTED  Intervention: Educate patient and significant other/support system about the plan of care, procedures, treatments, medications and allow for questions  Note: Educate pt on plan of care, including medications, encourage and address questions.     Problem: Bowel/Gastric:  Goal: Normal bowel function is maintained or improved  Outcome: PROGRESSING AS EXPECTED     Educate pt on importance of maintaining normal bowel function, and interventions if needed   no chest pain and no edema.